# Patient Record
Sex: FEMALE | Race: WHITE | Employment: PART TIME | URBAN - METROPOLITAN AREA
[De-identification: names, ages, dates, MRNs, and addresses within clinical notes are randomized per-mention and may not be internally consistent; named-entity substitution may affect disease eponyms.]

---

## 2021-04-06 ENCOUNTER — OFFICE VISIT (OUTPATIENT)
Dept: ORTHOPEDIC SURGERY | Age: 46
End: 2021-04-06
Payer: COMMERCIAL

## 2021-04-06 VITALS — HEIGHT: 60 IN | BODY MASS INDEX: 21.6 KG/M2 | WEIGHT: 110 LBS | TEMPERATURE: 97.9 F

## 2021-04-06 DIAGNOSIS — M25.561 RIGHT KNEE PAIN, UNSPECIFIED CHRONICITY: Primary | ICD-10-CM

## 2021-04-06 DIAGNOSIS — M24.661 FIBROSIS OF RIGHT KNEE JOINT: ICD-10-CM

## 2021-04-06 PROCEDURE — 99204 OFFICE O/P NEW MOD 45 MIN: CPT | Performed by: ORTHOPAEDIC SURGERY

## 2021-04-06 NOTE — PROGRESS NOTES
12 [facility]  History and Physical  Knee Pain    Date:  2021    Name:  Sujey Ga  Address:  [place] [address]    :  1975      Age:   42 y.o.    SSN:  [id number]      Medical Record Number:  <[id number]>      Chief Complaint    New Patient (right knee )      History of Present Illness:  Sujey Ga is a 42 y.o. female who presents for new patient evaluation from [place] regarding her right knee. The patient has an extensive history to her right knee. The patient is a nurse in [place]. She lives 9 hours away. Back in 2019 in February the patient was skiing and injured her right knee. She sustained an ACL/MCL/lateral meniscus tear. She had a lot of swelling and stiffness to the knee. 5 weeks after that she underwent right knee ACL reconstruction with allograft, MCL healed on its own, and lateral meniscal repair for bucket-handle meniscus tear. The patient developed arthrofibrosis postoperatively and underwent an manipulation under anesthesia in May 2019. In 2019 she continued to have problems with arthrofibrosis recurrence and underwent a knee arthroscopy with notchplasty and synovectomy and manipulation under anesthesia again. The patient underwent multiple sessions of physical therapy but continues to have stiffness to the knee. She is here as a second opinion to see if there is any other options for her knee motion. She denies instability. She denies locking.       Pain Assessment  Location of Pain: Knee  Location Modifiers: Right  Severity of Pain: 3  Quality of Pain: Aching  Duration of Pain: Persistent  Frequency of Pain: Constant  Aggravating Factors: Bending, Squatting, Stairs, Walking, Other (Comment)(any prolonged activity)  Relieving Factors: Rest, Nsaids(motrin)  Result of Injury: Yes  Work-Related Injury: No  Are there other pain locations you wish to document?: No    No past medical history on file. No past surgical history on file. No family history on file. Social History     Socioeconomic History    Marital status:      Spouse name: Not on file    Number of children: Not on file    Years of education: Not on file    Highest education level: Not on file   Occupational History    Not on file   Social Needs    Financial resource strain: Not on file    Food insecurity     Worry: Not on file     Inability: Not on file    Transportation needs     Medical: Not on file     Non-medical: Not on file   Tobacco Use    Smoking status: Not on file   Substance and Sexual Activity    Alcohol use: Not on file    Drug use: Not on file    Sexual activity: Not on file   Lifestyle    Physical activity     Days per week: Not on file     Minutes per session: Not on file    Stress: Not on file   Relationships    Social connections     Talks on phone: Not on file     Gets together: Not on file     Attends Sabianist service: Not on file     Active member of club or organization: Not on file     Attends meetings of clubs or organizations: Not on file     Relationship status: Not on file    Intimate partner violence     Fear of current or ex partner: Not on file     Emotionally abused: Not on file     Physically abused: Not on file     Forced sexual activity: Not on file   Other Topics Concern    Not on file   Social History Narrative    Not on file       No current outpatient medications on file. No current facility-administered medications for this visit. No Known Allergies      Review of Systems:  A 14 point review of systems was completed by the patient and is available in the media section of the scanned medical record and was reviewed on 4/6/2021. The review is negative with the exception of those things mentioned in the HPI and Past Medical History     Review of Systems   Constitutional: Negative for fever and chills. HENT: Negative for congestion and eye pain.     Eyes: Negative for blurred vision and double vision. Respiratory: Negative for cough, shortness of breath and wheezing. Cardiovascular: Negative for chest pain and palpitations. Gastrointestinal: Negative for nausea. Negative for vomiting. Musculoskeletal: Positive for myalgias and joint pain right knee. Skin: Negative for itching and rash. Neurological: Negative for dizziness, sensory change and headaches. Psychiatric/Behavioral: Negative for depression and suicidal ideas. Vital Signs:   Temp 97.9 °F (36.6 °C) (Infrared)   Ht 5' (1.524 m)   Wt 110 lb (49.9 kg)   BMI 21.48 kg/m²     General Exam:    General: Farnaz Smith is a healthy and well appearing 42y.o. year old female who is sitting comfortably in our office in no acute distress. Neuro: Alert & Oriented x 3,  normal,  no focal deficits noted. Normal mood & affect. Knee Examination: Right    Inspection:   No effusion, ecchymosis, discoloration, erythema, excessive warmth. no gross deformities, no signs of infection. Healed surgical scars about the knee. Palpation: There is no patellofemoral crepitus, there is patellofemoral joint line tenderness. No other osseous or soft tissue tenderness around the knee. Negative calf tenderness    Range of Motion: 5-125 degrees with some pain on deep flexion and full extension    Strength:  5/5 quadriceps, 5/5 hamstrings    Special Tests:  No ligament instability, valgus and varus stress test are stable at 0 and 30°. Lachman's exhibits a solid endpoint. Negative posterior drawer. Negative Homans sign. Less than 1 quadrant motion patella medial and lateral.  Tight lateral retinaculum and 30 degrees of flexion.     Gait:  Steady, Non antalgic, without the use of assistive devices    Alignment: No varus deformity    Neuro: Sensation equal bilateral lower extremities    Vascular: 2+ posterior tibialis pulse      Contralateral Knee Comparison Examination: Left    Inspection:  No effusion, ecchymosis, discoloration, erythema, excessive warmth. no gross deformities, no signs of infection. Palpation: There is no patellofemoral crepitus, there is no joint line tenderness. No other osseous or soft tissue tenderness around the knee. Negative calf tenderness    Range of Motion:  0-140 degrees without pain or difficulty    Strength:  5/5 quadriceps, 5/5 hamstrings    Special Tests:   No ligament instability, valgus and varus stress test are stable at 0 and 30°. Lachman's exhibits a solid endpoint. Negative posterior drawer. Negative Homans sign    Gait:  Steady, Non antalgic, without the use of assistive devices    Alignment: No Varus deformity    Neuro: Sensation equal bilateral lower extremities    Vascular: 2+ posterior tibialis pulse      Radiology:     X-rays obtained and reviewed in office: AP and merchant view of both knees and a lateral of the right knee. Findings:   Views: 3 views of the right knee  Weight bearing: Yes  Findings: 3 views of the right knee taken in the office today demonstrate no acute osseous abnormalities. There is evidence of a prior ACL reconstruction with appropriately placed ACL tunnels. Slight lateral patellar tilt. Slight patellofemoral arthritic changes. Well-maintained medial lateral joint space. Previous comparison films: None    Impression:  1. Prior ACL reconstruction right knee with mild patellofemoral arthritis. Office Procedures:  Orders Placed This Encounter   Procedures    XR KNEE RIGHT (3 VIEWS)     Standing Status:   Future     Number of Occurrences:   1     Standing Expiration Date:   4/6/2022            Assessment: Patient is a 42 y.o. 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     Visit Diagnoses       Codes    Right knee pain, unspecified chronicity    -  Primary M25.561          No orders of the defined types were placed in this for evaluation, education, and development of treatment plan: 55 minutes. Claude Sarkar DO  [facility] Clinical Fellow  4/6/2021      This dictation was performed with a verbal recognition program [facility] ) and it was checked for errors. It is possible that there are still dictated errors within this office note. If so, please bring any errors to my attention for an addendum. All efforts were made to ensure that this office note is accurate. This dictation was performed with a verbal recognition program (DRAGON) and it was checked for errors. It is possible that there are still dictated errors within this office note. If so, please bring any errors to my attention for an addendum. All efforts were made to ensure that this office note is accurate. Supervising Physician Attestation:  I, Dr. Tavon Cuevas, personally performed the services described in this documentation as scribed above, and it is both accurate and complete and I agree with all pertinent clinical information. I personally interviewed the patient and performed a physical examination and medical decision making. I discussed the patient's condition and treatment options and have  reviewed and agree with the past medical, family and social history unless otherwise noted. All of the patient's questions were answered.       Board Certified Orthopaedic Surgeon  [facility] and [address] [place] [address] and [address] [employer]  Professor of Cristine Salvador

## 2021-04-07 DIAGNOSIS — M24.661 FIBROSIS OF RIGHT KNEE JOINT: Primary | ICD-10-CM

## 2021-04-12 ENCOUNTER — TELEPHONE (OUTPATIENT)
Dept: ORTHOPEDIC SURGERY | Age: 46
End: 2021-04-12

## 2021-04-12 NOTE — TELEPHONE ENCOUNTER
Auth: NPR  Date: 5/12/2021   Reference # [id number]  Spoke with: Online  Type of SX: Outpatient  Location: [facility]  CPT [id number]   SX area: Rt knee  Insurance: [address] [place] [address]

## 2021-05-06 RX ORDER — IBUPROFEN 200 MG
200 TABLET ORAL EVERY 6 HOURS PRN
Status: ON HOLD | COMMUNITY
End: 2021-05-12 | Stop reason: HOSPADM

## 2021-05-06 RX ORDER — NITROFURANTOIN 25; 75 MG/1; MG/1
100 CAPSULE ORAL PRN
COMMUNITY

## 2021-05-06 RX ORDER — M-VIT,TX,IRON,MINS/CALC/FOLIC 27MG-0.4MG
1 TABLET ORAL DAILY
COMMUNITY

## 2021-05-06 NOTE — PROGRESS NOTES
[address] patients having surgery or anesthesia are required to be Covid tested. You will need to quarantine from the time you are tested until your surgery. PRIOR TO PROCEDURE DATE:        1. PLEASE FOLLOW ANY  GUIDELINES/ INSTRUCTIONS PRIOR TO YOUR PROCEDURE AS ADVISED BY YOUR SURGEON. 2. Arrange for someone to drive you home and be with you for the first 24 hours after discharge for your safety after your procedure for which you received sedation. Ensure it is someone we can share information with regarding your discharge. 3. You must contact your surgeon for instructions IF:   You are taking any blood thinners, aspirin, anti-inflammatory or vitamin E.   There is a change in your physical condition such as a cold, fever, rash, cuts, sores or any other infection, especially near your surgical site. 4. Do not drink alcohol the day before or day of your procedure. 5. A Pre-op History and Physical for surgery MUST be completed by your Physician or Urgent Care within 30 days of your procedure date. Please bring a copy with you on the day of your procedure and along with any other testing performed. THE DAY OF YOUR PROCEDURE:  1. Follow instructions for ARRIVAL TIME as DIRECTED BY YOUR SURGEON. 2. Enter the MAIN entrance from [address] and follow the signs to the free [employer] [facility] or [employer] parking (offered free of charge 6am-5pm). 3. Enter the Main Entrance of the hospital (do not enter from the lower level of the parking garage). Upon entrance, check in with the  at the main desk on your left. If no one is available at the desk, proceed into the [facility] Waiting Room and go through the door directly into the [facility]. There is a Check-in desk ACROSS from Room 5 (marked with a sign hanging from the ceiling).  The phone number for the surgery center is [phone]. 4. Please call [phone] option #2 option #2 if you have not been preregistered yet. On the day of your procedure bring your insurance card and photo ID. You will be registered at your bedside once brought back to your room. 5. DO NOT EAT ANYTHING eight hours prior to your arrival for surgery. May have 8 ounces of water 4 hours prior to your arrival for surgery. NOTE: ALL Gastric, Bariatric and Bowel surgery patients MUST follow their surgeon's instructions. 6. MEDICATIONS    Take the following medications with a SMALL sip of water: NONE   Use your usual dose of inhalers the morning of surgery. BRING your rescue inhaler with you to hospital.    Anesthesia does NOT want you to take insulin the morning of surgery. They will control your blood sugar while you are at the hospital. Please contact your ordering physician for instructions regarding your insulin the night before your procedure. If you have an insulin pump, please keep it set on basal rate. 7. Do not swallow water when brushing teeth. No gum, candy, mints or ice chips. Refrain from smoking or at least decrease the amount. 8. Dress in loose, comfortable clothing appropriate for redressing after your procedure. Do not wear jewelry (including body piercings), make-up (especially NO eye make-up), fingernail polish (NO toenail polish if foot/leg surgery), lotion, powders or metal hairclips. 9. Dentures, glasses, or contacts will need to be removed before your procedure. Bring cases for your glasses, contacts, dentures, or hearing aids to protect them while you are in surgery. 10. If you use a CPAP, please bring it with you on the day of your procedure. 11. We recommend that valuable personal  belongings such as cash, cell phones, e-tablets or jewelry, be left at home during your stay. The hospital will not be responsible for valuables that are not secured in the hospital safe.  However, if your insurance requires a co-pay, you may want to bring a method of payment, i.e. Check or credit card, if you wish to pay your co-pay the day of surgery. 12. If you are to stay overnight, you may bring a bag with personal items. Please have any large items you may need brought in by your family after your arrival to your hospital room. 15. If you have a Living Will or Durable Power of , please bring a copy on the day of your procedure. 15. With your permission, one family member may accompany you while you are being prepared for surgery. Once you are ready, additional family members may join you. HOW WE KEEP YOU SAFE and WORK TO PREVENT SURGICAL SITE INFECTIONS:  1. Health care workers should always check your ID bracelet to verify your name and birth date. You will be asked many times to state your name, date of birth, and allergies. 2. Health care workers should always clean their hands with soap or alcohol gel before providing care to you. It is okay to ask anyone if they cleaned their hands before they touch you. 3. You will be actively involved in verifying the type of procedure you are having and ensuring the correct surgical site. This will be confirmed multiple times prior to your procedure. Do NOT selma your surgery site UNLESS instructed to by your surgeon. 4. Do not shave or wax for 72 hours prior to procedure near your operative site. Shaving with a razor can irritate your skin and make it easier to develop an infection. On the day of your procedure, any hair that needs to be removed near the surgical site will be clipped by a healthcare worker using a special clippers designed to avoid skin irritation. 5. When you are in the operating room, your surgical site will be cleansed with a special soap, and in most cases, you will be given an antibiotic before the surgery begins. What to expect AFTER YOUR PROCEDURE:  1.  Immediately following your procedure, your will be taken to the PACU for

## 2021-05-11 ENCOUNTER — CONSENT FORM (OUTPATIENT)
Dept: ORTHOPEDIC SURGERY | Age: 46
End: 2021-05-11

## 2021-05-11 ENCOUNTER — HOSPITAL ENCOUNTER (OUTPATIENT)
Dept: PHYSICAL THERAPY | Age: 46
Setting detail: THERAPIES SERIES
Discharge: HOME | End: 2021-05-11
Payer: COMMERCIAL

## 2021-05-11 ENCOUNTER — ANESTHESIA EVENT (OUTPATIENT)
Dept: OPERATING ROOM | Age: 46
End: 2021-05-11
Payer: COMMERCIAL

## 2021-05-11 ENCOUNTER — OFFICE VISIT (OUTPATIENT)
Dept: ORTHOPEDIC SURGERY | Age: 46
End: 2021-05-11
Payer: COMMERCIAL

## 2021-05-11 VITALS — BODY MASS INDEX: 21.99 KG/M2 | HEIGHT: 60 IN | WEIGHT: 111.99 LBS

## 2021-05-11 DIAGNOSIS — M24.661 FIBROSIS OF RIGHT KNEE JOINT: Primary | ICD-10-CM

## 2021-05-11 DIAGNOSIS — M25.561 RIGHT KNEE PAIN, UNSPECIFIED CHRONICITY: ICD-10-CM

## 2021-05-11 PROCEDURE — E0114 CRUTCH UNDERARM PAIR NO WOOD: HCPCS | Performed by: ORTHOPAEDIC SURGERY

## 2021-05-11 PROCEDURE — MISC250 COMPRESSION STOCKING: Performed by: ORTHOPAEDIC SURGERY

## 2021-05-11 PROCEDURE — 99214 OFFICE O/P EST MOD 30 MIN: CPT | Performed by: ORTHOPAEDIC SURGERY

## 2021-05-11 NOTE — PROGRESS NOTES
effect during my hospitalization, the order may or may not be in effect during this procedure. I give my doctor permission to give me blood or blood products. I understand that there are risks with receiving blood such as hepatitis, AIDS, fever, or allergic reaction. I acknowledge that the risks, benefits, and alternatives of this treatment have been explained to me and that no express or implied warranty has been given by the hospital, any blood bank, or any person or entity as to the blood or blood components transfused. At the discretion of my doctor, I agree to allow observers, equipment/product representatives and allow photographing, and/or televising of the procedure, provided my name or identity is maintained confidentially. I agree the hospital may dispose of or use for scientific or educational purposes any tissue, fluid, or body parts which may be removed.     ________________________________Date________Time______ am/pm  ([facility])  Patient or Signature of Closest Relative or Legal Guardian    ________________________________Date________Time______am/pm      Page 1 of  1  Witness

## 2021-05-11 NOTE — PROGRESS NOTES
[address] [place] [address]  History and Physical      Date:  2021    Name:  Jesus Ham  Address:  [address] [place] [address]    :  1975      Age:   42 y.o.    SSN:  [id number]      Medical Record Number:  <[id number]>      Chief Complaint    Pre-op Exam (right knee med and lat releases on 21)      History of Present Illness:  Jesus Ham is a 42 y.o. female who presents for their pre-op examination. The patient is in good health. The patient has no history of blood clots, no organ dysfunction, not diabetes, tolerates pain medications and is not on any estrogen products. Patient has an allergy to latex which causes her extensive amount of coughing. She also gets blisters with adhesive tape. The patient recently had a physical from her PCP and was cleared for surgery and stated being in good health. Patient's PCP note for preoperative clearance was reviewed. All the patient's labs were reviewed and were remarkable for gross hematuria. Patient has had a full work-up for this condition which was unremarkable. Prior history: 2021  This is a patient from [place] regarding her right knee. The patient has an extensive history to her right knee. The patient is a nurse in [place]. She lives 9 hours away. Back in  in February the patient was skiing and injured her right knee. She sustained an ACL/MCL/lateral meniscus tear. She had a lot of swelling and stiffness to the knee. 5 weeks after that she underwent right knee ACL reconstruction with allograft, MCL healed on its own, and lateral meniscal repair for bucket-handle meniscus tear. The patient developed arthrofibrosis postoperatively and underwent an manipulation under anesthesia in May 2019.   In 2019 she continued to have problems with arthrofibrosis recurrence and underwent a knee arthroscopy with notchplasty and synovectomy and manipulation under anesthesia again.  The patient underwent multiple sessions of physical therapy but continues to have stiffness to the knee.         Pain Assessment  Location of Pain: Knee  Location Modifiers: Right  Severity of Pain: 2  Quality of Pain: Aching  Duration of Pain: Persistent  Frequency of Pain: Constant  Aggravating Factors: Walking, Stairs, Other (Comment)(flexion)  Relieving Factors: Rest  Result of Injury: Yes  Work-Related Injury: No  Are there other pain locations you wish to document?: No    Past Medical History:   Diagnosis Date    Arthritis     History of UTI     Osteoarthritis     PONV (postoperative nausea and vomiting)         Past Surgical History:   Procedure Laterality Date    BREAST ENHANCEMENT SURGERY      KNEE SURGERY      OVARIAN CYST REMOVAL         Family History   Problem Relation Age of Onset    Cancer Mother     Heart Disease Mother     High Blood Pressure Father        Social History     Socioeconomic History    Marital status:      Spouse name: None    Number of children: None    Years of education: None    Highest education level: None   Occupational History    None   Social Needs    Financial resource strain: None    Food insecurity     Worry: None     Inability: None    Transportation needs     Medical: None     Non-medical: None   Tobacco Use    Smoking status: Never Smoker    Smokeless tobacco: Never Used   Substance and Sexual Activity    Alcohol use: Yes     Comment: RARE    Drug use: Never    Sexual activity: None   Lifestyle    Physical activity     Days per week: None     Minutes per session: None    Stress: None   Relationships    Social connections     Talks on phone: None     Gets together: None     Attends Scientology service: None     Active member of club or organization: None     Attends meetings of clubs or organizations: None     Relationship status: None    Intimate partner violence     Fear of current or ex partner: None     Emotionally abused: None Physically abused: None     Forced sexual activity: None   Other Topics Concern    None   Social History Narrative    None       Current Outpatient Medications   Medication Sig Dispense Refill    Multiple Vitamins-Minerals (THERAPEUTIC MULTIVITAMIN-MINERALS) tablet Take 1 tablet by mouth daily      nitrofurantoin, macrocrystal-monohydrate, (MACROBID) 100 MG capsule Take 100 mg by mouth as needed      ibuprofen (ADVIL;MOTRIN) 200 MG tablet Take 200 mg by mouth every 6 hours as needed for Pain       No current facility-administered medications for this visit. Allergies   Allergen Reactions    Latex      COUGHING    Adhesive Tape      BLISTER       Review of Systems:  A 14 point review of systems was completed by the patient and is available in the media section of the scanned medical record and was reviewed on 5/11/2021. The review is negative with the exception of those things mentioned in the HPI and Past Medical History       Vital Signs:   Ht 5' (1.524 m)   Wt 111 lb 15.9 oz (50.8 kg)   BMI 21.87 kg/m²     General Exam:    Neuro: Alert & Oriented x 3,  normal,  no focal deficits noted. Normal mood & affect. Eyes: Sclera clear  Ears: Normal external ear  Mouth:  No perioral lesions  Pulm: Respirations unlabored and regular   Skin: Warm, well perfused      Knee Examination: Right     Inspection: Well-healed scars from prior surgery. No effusion, ecchymosis, discoloration, erythema, excessive warmth. no gross deformities, no signs of infection. Healed surgical scars about the knee.     Palpation: There is no patellofemoral crepitus, there is patellofemoral joint line tenderness. No other osseous or soft tissue tenderness around the knee. Negative calf tenderness     Range of Motion: 5-110 degrees with some pain on deep flexion and full extension     Strength: Grossly intact     Special Tests:  No ligament instability, Negative Homans sign.   Less than 1 quadrant motion patella medial and lateral.  Tight lateral retinaculum and 30 degrees of flexion.     Gait:  Steady, Non antalgic, without the use of assistive devices     Alignment: No varus deformity     Neuro: Sensation equal bilateral lower extremities     Vascular: 2+ posterior tibialis pulse             Office Procedures:  Orders Placed This Encounter   Procedures    Compression Stocking $30     Patient was supplied a Compression Sleeve. This retail item was supplied to provide functional support and assist in controlling swelling in the lower extremities. Verbal and written instructions for the use of and application of this item were provided. The patient was educated and fit by a healthcare professional with expert knowledge and specialization in brace application. They were instructed to contact the office immediately should the equipment result in increased pain, decreased sensation, increased swelling or worsening of the condition.  Aluminum Crutches     Patient was prescribed Medline Aluminum Crutches. This mobility device is required for the following reasons:    Patient has mobility limitations that significantly impair their ability to participate in one or more mobility related activities of daily living. The patient is able to safely use the mobility device. Functional mobility deficit can be sufficiently resolved with the use of this device. Verbal and written instructions for the use of and application of this item were provided. The patient was educated and fit by a healthcare professional with expert knowledge and specialization in brace application while under the direct supervision of the treating physician. They were instructed to contact the office immediately should the equipment result in increased pain, decreased sensation, increased swelling or worsening of the condition. Assessment: Patient is a 42 y.o. female presenting to the office for her preoperative visit.   Patient is scheduled to undergo a right medial lateral release and Z-plasty lengthening. Visit Diagnoses       Codes    Fibrosis of right knee joint    -  Primary M24.661    Right knee pain, unspecified chronicity     M25.561            Medical decision making/treatment plan:  Patient's workup and evaluation were reviewed with the patient in the office today. Perioperative and postoperative course was thoroughly reviewed with the patient. She will be staying in town of a hotel for 2 weeks postoperatively to attend therapy. The patient is scheduled for right medial lateral release and Z-plasty lengthening and a discussion and comprehensive presentation of right medial lateral release and Z-plasty lengthening including providing extensive information and written material on the surgery, PO course and rehab and patient expectations and compliance. The PO instruction sheet is provided and details on the DVT program and skin preparation pre op explained. The risks explained included but not limited to infection, pain, swelling, woumd healing, blood clots, bleeding, fibrosis, anesthesia, allergies meds, atrophy, and limitation in knee motion, need for additional surgery, alignment problems. The final result cannot be predicted and each patient responds to surgery differently. The patient verbally expressed an understanding and all questions answered. The patient has major arthritic damage to the knee joint with pain limiting daily activities and significant restrictions and limitations effecting the quality of life. All conservative measures have been completed and the patient wishes to undergo right medial lateral release and Z-plasty lengthening. All the patient's questions were answered while in the clinic. The patient is understanding of all instructions and agrees with the plan.       This patient exhibits moderate complexity for obtaining an independent history, reviewing past medical records, independent interpretation of diagnostic imaging, and further coordinating care. The patient exhibits high risk given that the patient is undergoing a major elective orthopedic surgery with identified risk factors. Approximately 30 minutes were spent reviewing past medical records, imaging, educating the patient, and coordinating care. 05/11/21  1:17 PM                [address]fa[address] Dai, WILLIAM    Physician Assistant - Certified  [facility] and [address] [place] [address]    05/11/21 1:17 PM    During this examination, I, [address] Tesfaye Lala, [address] [place] [address], functioned as a scribe for Dr. Winsome Bundy. This dictation was performed with a verbal recognition program (DRAGON) and it was checked for errors. It is possible that there are still dictated errors within this office note. If so, please bring any errors to my attention for an addendum. All efforts were made to ensure that this office note is accurate. This dictation was performed with a verbal recognition program (DRAGON) and it was checked for errors. It is possible that there are still dictated errors within this office note. If so, please bring any errors to my attention for an addendum. All efforts were made to ensure that this office note is accurate. Supervising Physician Attestation:  I, Dr. Winsome Bundy, personally performed the services described in this documentation as scribed above, and it is both accurate and complete and I agree with all pertinent clinical information. I personally interviewed the patient and performed a physical examination and medical decision making. I discussed the patient's condition and treatment options and have  reviewed and agree with the past medical, family and social history unless otherwise noted. All of the patient's questions were answered.       Board Certified Orthopaedic Surgeon  [facility] and [facility] and [address] and [facility]  Professor of Cristine Perez

## 2021-05-11 NOTE — FLOWSHEET NOTE
[facility]    Physical Therapy  Cancellation/No-show Note  Patient Name:  Inge Strange  :  1975   Date:  2021  Cancelled visits to date: 1  No-shows to date: 0    For today's appointment patient:  [x]  Cancelled  []  Rescheduled appointment  []  No-show     Reason given by patient:  []  Patient ill  []  Conflicting appointment   []  No transportation    []  Conflict with work  []  No reason given  [x]  Other: Patient was upstairs with MD for 90  Minutes and missed pre-op appointment; all pre-op paperwork was given to patient as well as PT contact information; she will follow up Thursday at PO appointment.      Comments:      Electronically signed by:  Gayatri Gonzales, PT, DPT, OCS

## 2021-05-12 ENCOUNTER — ANESTHESIA (OUTPATIENT)
Dept: OPERATING ROOM | Age: 46
End: 2021-05-12
Payer: COMMERCIAL

## 2021-05-12 ENCOUNTER — HOSPITAL ENCOUNTER (OUTPATIENT)
Age: 46
Setting detail: OUTPATIENT SURGERY
Discharge: HOME | End: 2021-05-12
Attending: ORTHOPAEDIC SURGERY | Admitting: ORTHOPAEDIC SURGERY
Payer: COMMERCIAL

## 2021-05-12 VITALS
BODY MASS INDEX: 21.99 KG/M2 | HEART RATE: 93 BPM | TEMPERATURE: 96.9 F | OXYGEN SATURATION: 97 % | WEIGHT: 112 LBS | RESPIRATION RATE: 15 BRPM | HEIGHT: 60 IN | DIASTOLIC BLOOD PRESSURE: 82 MMHG | SYSTOLIC BLOOD PRESSURE: 132 MMHG

## 2021-05-12 VITALS
DIASTOLIC BLOOD PRESSURE: 66 MMHG | RESPIRATION RATE: 10 BRPM | SYSTOLIC BLOOD PRESSURE: 101 MMHG | OXYGEN SATURATION: 99 % | TEMPERATURE: 97.3 F

## 2021-05-12 DIAGNOSIS — M24.661 ARTHROFIBROSIS OF KNEE JOINT, RIGHT: ICD-10-CM

## 2021-05-12 LAB — PREGNANCY, URINE: NEGATIVE

## 2021-05-12 PROCEDURE — 2580000003 HC RX 258: Performed by: ANESTHESIOLOGY

## 2021-05-12 PROCEDURE — C9290 INJ, BUPIVACAINE LIPOSOME: HCPCS | Performed by: ANESTHESIOLOGY

## 2021-05-12 PROCEDURE — 3600000014 HC SURGERY LEVEL 4 ADDTL 15MIN: Performed by: ORTHOPAEDIC SURGERY

## 2021-05-12 PROCEDURE — 7100000001 HC PACU RECOVERY - ADDTL 15 MIN: Performed by: ORTHOPAEDIC SURGERY

## 2021-05-12 PROCEDURE — 3700000000 HC ANESTHESIA ATTENDED CARE: Performed by: ORTHOPAEDIC SURGERY

## 2021-05-12 PROCEDURE — 87075 CULTR BACTERIA EXCEPT BLOOD: CPT

## 2021-05-12 PROCEDURE — 6360000002 HC RX W HCPCS: Performed by: ORTHOPAEDIC SURGERY

## 2021-05-12 PROCEDURE — 2500000003 HC RX 250 WO HCPCS: Performed by: ANESTHESIOLOGY

## 2021-05-12 PROCEDURE — 2709999900 HC NON-CHARGEABLE SUPPLY: Performed by: ORTHOPAEDIC SURGERY

## 2021-05-12 PROCEDURE — 87176 TISSUE HOMOGENIZATION CULTR: CPT

## 2021-05-12 PROCEDURE — 7100000011 HC PHASE II RECOVERY - ADDTL 15 MIN: Performed by: ORTHOPAEDIC SURGERY

## 2021-05-12 PROCEDURE — 88331 PATH CONSLTJ SURG 1 BLK 1SPC: CPT

## 2021-05-12 PROCEDURE — 64447 NJX AA&/STRD FEMORAL NRV IMG: CPT | Performed by: ANESTHESIOLOGY

## 2021-05-12 PROCEDURE — 7100000000 HC PACU RECOVERY - FIRST 15 MIN: Performed by: ORTHOPAEDIC SURGERY

## 2021-05-12 PROCEDURE — 6370000000 HC RX 637 (ALT 250 FOR IP): Performed by: ANESTHESIOLOGY

## 2021-05-12 PROCEDURE — 3600000004 HC SURGERY LEVEL 4 BASE: Performed by: ORTHOPAEDIC SURGERY

## 2021-05-12 PROCEDURE — 87205 SMEAR GRAM STAIN: CPT

## 2021-05-12 PROCEDURE — 3700000001 HC ADD 15 MINUTES (ANESTHESIA): Performed by: ORTHOPAEDIC SURGERY

## 2021-05-12 PROCEDURE — 6360000002 HC RX W HCPCS: Performed by: ANESTHESIOLOGY

## 2021-05-12 PROCEDURE — 2500000003 HC RX 250 WO HCPCS: Performed by: NURSE ANESTHETIST, CERTIFIED REGISTERED

## 2021-05-12 PROCEDURE — 87070 CULTURE OTHR SPECIMN AEROBIC: CPT

## 2021-05-12 PROCEDURE — 7100000010 HC PHASE II RECOVERY - FIRST 15 MIN: Performed by: ORTHOPAEDIC SURGERY

## 2021-05-12 PROCEDURE — 84703 CHORIONIC GONADOTROPIN ASSAY: CPT

## 2021-05-12 PROCEDURE — 2580000003 HC RX 258: Performed by: ORTHOPAEDIC SURGERY

## 2021-05-12 PROCEDURE — 88305 TISSUE EXAM BY PATHOLOGIST: CPT

## 2021-05-12 PROCEDURE — 6360000002 HC RX W HCPCS: Performed by: NURSE ANESTHETIST, CERTIFIED REGISTERED

## 2021-05-12 RX ORDER — FENTANYL CITRATE 50 UG/ML
25 INJECTION, SOLUTION INTRAMUSCULAR; INTRAVENOUS EVERY 5 MIN PRN
Status: DISCONTINUED | OUTPATIENT
Start: 2021-05-12 | End: 2021-05-12 | Stop reason: HOSPADM

## 2021-05-12 RX ORDER — SCOLOPAMINE TRANSDERMAL SYSTEM 1 MG/1
1 PATCH, EXTENDED RELEASE TRANSDERMAL
Status: DISCONTINUED | OUTPATIENT
Start: 2021-05-12 | End: 2021-05-12 | Stop reason: HOSPADM

## 2021-05-12 RX ORDER — VALACYCLOVIR HYDROCHLORIDE 1 G/1
1 TABLET, FILM COATED ORAL PRN
COMMUNITY

## 2021-05-12 RX ORDER — FENTANYL CITRATE 50 UG/ML
100 INJECTION, SOLUTION INTRAMUSCULAR; INTRAVENOUS ONCE
Status: DISCONTINUED | OUTPATIENT
Start: 2021-05-12 | End: 2021-05-12 | Stop reason: HOSPADM

## 2021-05-12 RX ORDER — OXYCODONE HYDROCHLORIDE AND ACETAMINOPHEN 5; 325 MG/1; MG/1
2 TABLET ORAL PRN
Status: COMPLETED | OUTPATIENT
Start: 2021-05-12 | End: 2021-05-12

## 2021-05-12 RX ORDER — CEFAZOLIN SODIUM 2 G/50ML
2000 SOLUTION INTRAVENOUS ONCE
Status: COMPLETED | OUTPATIENT
Start: 2021-05-12 | End: 2021-05-12

## 2021-05-12 RX ORDER — ROCURONIUM BROMIDE 10 MG/ML
INJECTION, SOLUTION INTRAVENOUS PRN
Status: DISCONTINUED | OUTPATIENT
Start: 2021-05-12 | End: 2021-05-12 | Stop reason: SDUPTHER

## 2021-05-12 RX ORDER — MEPERIDINE HYDROCHLORIDE 25 MG/ML
12.5 INJECTION INTRAMUSCULAR; INTRAVENOUS; SUBCUTANEOUS EVERY 5 MIN PRN
Status: DISCONTINUED | OUTPATIENT
Start: 2021-05-12 | End: 2021-05-12 | Stop reason: HOSPADM

## 2021-05-12 RX ORDER — DIPHENHYDRAMINE HYDROCHLORIDE 50 MG/ML
12.5 INJECTION INTRAMUSCULAR; INTRAVENOUS
Status: DISCONTINUED | OUTPATIENT
Start: 2021-05-12 | End: 2021-05-12 | Stop reason: HOSPADM

## 2021-05-12 RX ORDER — NEOSTIGMINE METHYLSULFATE 5 MG/5 ML
SYRINGE (ML) INTRAVENOUS PRN
Status: DISCONTINUED | OUTPATIENT
Start: 2021-05-12 | End: 2021-05-12 | Stop reason: SDUPTHER

## 2021-05-12 RX ORDER — BUPIVACAINE HYDROCHLORIDE 5 MG/ML
INJECTION, SOLUTION EPIDURAL; INTRACAUDAL
Status: COMPLETED | OUTPATIENT
Start: 2021-05-12 | End: 2021-05-12

## 2021-05-12 RX ORDER — MIDAZOLAM HYDROCHLORIDE 1 MG/ML
2 INJECTION INTRAMUSCULAR; INTRAVENOUS ONCE
Status: COMPLETED | OUTPATIENT
Start: 2021-05-12 | End: 2021-05-12

## 2021-05-12 RX ORDER — PROPOFOL 10 MG/ML
INJECTION, EMULSION INTRAVENOUS PRN
Status: DISCONTINUED | OUTPATIENT
Start: 2021-05-12 | End: 2021-05-12 | Stop reason: SDUPTHER

## 2021-05-12 RX ORDER — OXYCODONE HYDROCHLORIDE AND ACETAMINOPHEN 5; 325 MG/1; MG/1
1 TABLET ORAL EVERY 6 HOURS PRN
Qty: 28 TABLET | Refills: 0 | Status: SHIPPED | OUTPATIENT
Start: 2021-05-12 | End: 2021-05-19

## 2021-05-12 RX ORDER — LIDOCAINE HCL/PF 100 MG/5ML
SYRINGE (ML) INJECTION PRN
Status: DISCONTINUED | OUTPATIENT
Start: 2021-05-12 | End: 2021-05-12 | Stop reason: SDUPTHER

## 2021-05-12 RX ORDER — DEXAMETHASONE SODIUM PHOSPHATE 4 MG/ML
INJECTION, SOLUTION INTRA-ARTICULAR; INTRALESIONAL; INTRAMUSCULAR; INTRAVENOUS; SOFT TISSUE PRN
Status: DISCONTINUED | OUTPATIENT
Start: 2021-05-12 | End: 2021-05-12

## 2021-05-12 RX ORDER — OXYCODONE HYDROCHLORIDE AND ACETAMINOPHEN 5; 325 MG/1; MG/1
1 TABLET ORAL PRN
Status: COMPLETED | OUTPATIENT
Start: 2021-05-12 | End: 2021-05-12

## 2021-05-12 RX ORDER — ONDANSETRON 2 MG/ML
INJECTION INTRAMUSCULAR; INTRAVENOUS PRN
Status: DISCONTINUED | OUTPATIENT
Start: 2021-05-12 | End: 2021-05-12 | Stop reason: SDUPTHER

## 2021-05-12 RX ORDER — AMOXICILLIN 250 MG
2 CAPSULE ORAL NIGHTLY
Qty: 28 TABLET | Refills: 0 | Status: SHIPPED | OUTPATIENT
Start: 2021-05-12 | End: 2021-05-26

## 2021-05-12 RX ORDER — GLYCOPYRROLATE 1 MG/5 ML
SYRINGE (ML) INTRAVENOUS PRN
Status: DISCONTINUED | OUTPATIENT
Start: 2021-05-12 | End: 2021-05-12 | Stop reason: SDUPTHER

## 2021-05-12 RX ORDER — ONDANSETRON 4 MG/1
4 TABLET, FILM COATED ORAL 3 TIMES DAILY PRN
Qty: 15 TABLET | Refills: 0 | Status: ON HOLD | OUTPATIENT
Start: 2021-05-12 | End: 2021-06-28 | Stop reason: SDUPTHER

## 2021-05-12 RX ORDER — PROCHLORPERAZINE EDISYLATE 5 MG/ML
5 INJECTION INTRAMUSCULAR; INTRAVENOUS
Status: DISCONTINUED | OUTPATIENT
Start: 2021-05-12 | End: 2021-05-12 | Stop reason: HOSPADM

## 2021-05-12 RX ORDER — APREPITANT 40 MG/1
40 CAPSULE ORAL ONCE
Status: COMPLETED | OUTPATIENT
Start: 2021-05-12 | End: 2021-05-12

## 2021-05-12 RX ORDER — VANCOMYCIN HYDROCHLORIDE 1 G/20ML
INJECTION, POWDER, LYOPHILIZED, FOR SOLUTION INTRAVENOUS PRN
Status: DISCONTINUED | OUTPATIENT
Start: 2021-05-12 | End: 2021-05-12 | Stop reason: ALTCHOICE

## 2021-05-12 RX ORDER — ONDANSETRON 2 MG/ML
4 INJECTION INTRAMUSCULAR; INTRAVENOUS
Status: DISCONTINUED | OUTPATIENT
Start: 2021-05-12 | End: 2021-05-12 | Stop reason: HOSPADM

## 2021-05-12 RX ORDER — FENTANYL CITRATE 50 UG/ML
50 INJECTION, SOLUTION INTRAMUSCULAR; INTRAVENOUS EVERY 5 MIN PRN
Status: DISCONTINUED | OUTPATIENT
Start: 2021-05-12 | End: 2021-05-12 | Stop reason: HOSPADM

## 2021-05-12 RX ORDER — BUPIVACAINE HYDROCHLORIDE 5 MG/ML
30 INJECTION, SOLUTION EPIDURAL; INTRACAUDAL ONCE
Status: DISCONTINUED | OUTPATIENT
Start: 2021-05-12 | End: 2021-05-12 | Stop reason: HOSPADM

## 2021-05-12 RX ORDER — LABETALOL HYDROCHLORIDE 5 MG/ML
5 INJECTION, SOLUTION INTRAVENOUS EVERY 10 MIN PRN
Status: DISCONTINUED | OUTPATIENT
Start: 2021-05-12 | End: 2021-05-12 | Stop reason: HOSPADM

## 2021-05-12 RX ORDER — HYDRALAZINE HYDROCHLORIDE 20 MG/ML
5 INJECTION INTRAMUSCULAR; INTRAVENOUS EVERY 10 MIN PRN
Status: DISCONTINUED | OUTPATIENT
Start: 2021-05-12 | End: 2021-05-12 | Stop reason: HOSPADM

## 2021-05-12 RX ORDER — DEXAMETHASONE SODIUM PHOSPHATE 4 MG/ML
INJECTION, SOLUTION INTRA-ARTICULAR; INTRALESIONAL; INTRAMUSCULAR; INTRAVENOUS; SOFT TISSUE PRN
Status: DISCONTINUED | OUTPATIENT
Start: 2021-05-12 | End: 2021-05-12 | Stop reason: SDUPTHER

## 2021-05-12 RX ORDER — SODIUM CHLORIDE, SODIUM LACTATE, POTASSIUM CHLORIDE, CALCIUM CHLORIDE 600; 310; 30; 20 MG/100ML; MG/100ML; MG/100ML; MG/100ML
INJECTION, SOLUTION INTRAVENOUS CONTINUOUS
Status: DISCONTINUED | OUTPATIENT
Start: 2021-05-12 | End: 2021-05-12 | Stop reason: HOSPADM

## 2021-05-12 RX ADMIN — Medication 0.6 MG: at 11:02

## 2021-05-12 RX ADMIN — FENTANYL CITRATE 25 MCG: 50 INJECTION, SOLUTION INTRAMUSCULAR; INTRAVENOUS at 12:04

## 2021-05-12 RX ADMIN — BUPIVACAINE HYDROCHLORIDE 10 ML: 5 INJECTION, SOLUTION EPIDURAL; INTRACAUDAL; PERINEURAL at 09:07

## 2021-05-12 RX ADMIN — OXYCODONE HYDROCHLORIDE AND ACETAMINOPHEN 1 TABLET: 5; 325 TABLET ORAL at 13:18

## 2021-05-12 RX ADMIN — FENTANYL CITRATE 25 MCG: 50 INJECTION, SOLUTION INTRAMUSCULAR; INTRAVENOUS at 12:36

## 2021-05-12 RX ADMIN — ONDANSETRON 4 MG: 2 INJECTION INTRAMUSCULAR; INTRAVENOUS at 10:20

## 2021-05-12 RX ADMIN — CEFAZOLIN SODIUM 2000 MG: 2 SOLUTION INTRAVENOUS at 09:49

## 2021-05-12 RX ADMIN — SODIUM CHLORIDE, POTASSIUM CHLORIDE, SODIUM LACTATE AND CALCIUM CHLORIDE: 600; 310; 30; 20 INJECTION, SOLUTION INTRAVENOUS at 08:18

## 2021-05-12 RX ADMIN — SODIUM CHLORIDE, POTASSIUM CHLORIDE, SODIUM LACTATE AND CALCIUM CHLORIDE: 600; 310; 30; 20 INJECTION, SOLUTION INTRAVENOUS at 10:33

## 2021-05-12 RX ADMIN — FENTANYL CITRATE 50 MCG: 50 INJECTION, SOLUTION INTRAMUSCULAR; INTRAVENOUS at 11:38

## 2021-05-12 RX ADMIN — DEXAMETHASONE SODIUM PHOSPHATE 8 MG: 4 INJECTION, SOLUTION INTRAMUSCULAR; INTRAVENOUS at 10:20

## 2021-05-12 RX ADMIN — PROPOFOL 150 MG: 10 INJECTION, EMULSION INTRAVENOUS at 09:59

## 2021-05-12 RX ADMIN — BUPIVACAINE 20 ML: 13.3 INJECTION, SUSPENSION, LIPOSOMAL INFILTRATION at 09:07

## 2021-05-12 RX ADMIN — MIDAZOLAM HYDROCHLORIDE 2 MG: 2 INJECTION, SOLUTION INTRAMUSCULAR; INTRAVENOUS at 08:56

## 2021-05-12 RX ADMIN — FENTANYL CITRATE 100 MCG: 50 INJECTION, SOLUTION INTRAMUSCULAR; INTRAVENOUS at 08:57

## 2021-05-12 RX ADMIN — APREPITANT 40 MG: 40 CAPSULE ORAL at 08:54

## 2021-05-12 RX ADMIN — Medication 3 MG: at 11:02

## 2021-05-12 RX ADMIN — Medication 0.2 MG: at 09:58

## 2021-05-12 RX ADMIN — Medication 50 MG: at 09:58

## 2021-05-12 RX ADMIN — ROCURONIUM BROMIDE 40 MG: 10 INJECTION INTRAVENOUS at 10:00

## 2021-05-12 ASSESSMENT — PAIN DESCRIPTION - ONSET: ONSET: ON-GOING

## 2021-05-12 ASSESSMENT — PAIN DESCRIPTION - DESCRIPTORS
DESCRIPTORS: SHARP
DESCRIPTORS: ACHING;CONSTANT
DESCRIPTORS: BURNING;SHARP

## 2021-05-12 ASSESSMENT — PULMONARY FUNCTION TESTS
PIF_VALUE: 17
PIF_VALUE: 1
PIF_VALUE: 15
PIF_VALUE: 15
PIF_VALUE: 17
PIF_VALUE: 10
PIF_VALUE: 17
PIF_VALUE: 2
PIF_VALUE: 2
PIF_VALUE: 16
PIF_VALUE: 16
PIF_VALUE: 17
PIF_VALUE: 10
PIF_VALUE: 17
PIF_VALUE: 17
PIF_VALUE: 1
PIF_VALUE: 17
PIF_VALUE: 17
PIF_VALUE: 10
PIF_VALUE: 32
PIF_VALUE: 1
PIF_VALUE: 16
PIF_VALUE: 17
PIF_VALUE: 16
PIF_VALUE: 18
PIF_VALUE: 17
PIF_VALUE: 1
PIF_VALUE: 10
PIF_VALUE: 17
PIF_VALUE: 2
PIF_VALUE: 2
PIF_VALUE: 17
PIF_VALUE: 2
PIF_VALUE: 17
PIF_VALUE: 16
PIF_VALUE: 10
PIF_VALUE: 16
PIF_VALUE: 17
PIF_VALUE: 6
PIF_VALUE: 10
PIF_VALUE: 17

## 2021-05-12 ASSESSMENT — PAIN DESCRIPTION - ORIENTATION
ORIENTATION: RIGHT;ANTERIOR
ORIENTATION: RIGHT;ANTERIOR
ORIENTATION: ANTERIOR
ORIENTATION: RIGHT

## 2021-05-12 ASSESSMENT — PAIN SCALES - GENERAL
PAINLEVEL_OUTOF10: 6
PAINLEVEL_OUTOF10: 4
PAINLEVEL_OUTOF10: 4
PAINLEVEL_OUTOF10: 0

## 2021-05-12 ASSESSMENT — PAIN DESCRIPTION - FREQUENCY
FREQUENCY: CONTINUOUS
FREQUENCY: CONTINUOUS

## 2021-05-12 ASSESSMENT — PAIN DESCRIPTION - LOCATION
LOCATION: KNEE

## 2021-05-12 ASSESSMENT — PAIN DESCRIPTION - PAIN TYPE: TYPE: SURGICAL PAIN

## 2021-05-12 ASSESSMENT — LIFESTYLE VARIABLES: SMOKING_STATUS: 0

## 2021-05-12 NOTE — PROGRESS NOTES
Anesthesia Dr. Neida Herrera here to do Right Adductor Canal block. O2 placed 2L N/C  Start time:0900  Stop time:0905  Tolerated Well    See flow sheet for vital signs.

## 2021-05-12 NOTE — BRIEF OP NOTE
Brief Postoperative Note      Patient: Juwan Villarreal  YOB: 1975  MRN: [id number]    Date of Procedure: 5/12/2021    Pre-Op Diagnosis: Arthrofibrosis    Post-Op Diagnosis: Same       Procedure(s):  RIGHT KNEE OPEN MEDIAL AND LATERAL RELEASES-Z PLASTY LENGTHENING     Right Knee I&D    Surgeon(s):  MD Aileen Lee DO    Assistant:  Surgical Assistant: Rico Cardoso    Anesthesia: General    Estimated Blood Loss (mL): 60XQ    Complications: None    Specimens:   ID Type Source Tests Collected by Time Destination   1 : TIBIAL TUNNEL SWAB RIGHT KNEE Specimen Joint, Knee CULTURE, SURGICAL Nicci Hand MD 5/12/2021 1045    2 : TIBIAL TUNNEL TISSUE RIGHT KNEE Tissue Tissue CULTURE, TISSUE Nicci Hand MD 5/12/2021 1046    A : TIBIAL TUNNEL TISSUE RIGHT KNEE Tissue Tissue SURGICAL PATHOLOGY Nicci Hand MD 5/12/2021 1038    B : TIBIAL GRAFT RIGHT KNEE Tissue Tissue SURGICAL PATHOLOGY Nicci Hand MD 5/12/2021 1048        Implants:  * No implants in log *      Drains: * No LDAs found *    Findings: see op note    Electronically signed by Aileen Mei DO on 5/12/2021 at 11:11 AM

## 2021-05-12 NOTE — PROGRESS NOTES
4610 Clarified with Dr. Toma Goldmann concerning consent and added 'removal of hardware' with pt verbalization of understanding and Dr. Roxanne Swain stating 'pt will be receiving pre-op nerve block' with pt verbalization of understanding.

## 2021-05-12 NOTE — H&P
[facility] Day [facility] Update H & P  Department of General Surgery   Surgical Service   Pre-operative History and Physical  Last H & P within the last 30 days. DIAGNOSIS:   Arthrofibrosis of knee joint, right [M24.661]    Procedure(s):  RIGHT KNEE OPEN MEDIAL AND LATERAL RELEASES-Z PLASTY LENGTHENING     HISTORY OF PRESENT ILLNESS:   Patient with right knee pain and stiffness S/P ACL and lateral meniscus tear by an out of town surgeon. The symptoms have been recalcitrant to conservative treatment and the patient presents today for the above procedure. Covid 19:  Patient denies fever, chills, cough or known exposure to Covid-19.       Past Medical History:        Diagnosis Date    Arthritis     History of UTI     Osteoarthritis     PONV (postoperative nausea and vomiting)      Past Surgical History:        Procedure Laterality Date    BREAST ENHANCEMENT SURGERY      KNEE SURGERY      OVARIAN CYST REMOVAL       Past Social History:  Social History     Socioeconomic History    Marital status:      Spouse name: None    Number of children: None    Years of education: None    Highest education level: None   Occupational History    None   Social Needs    Financial resource strain: None    Food insecurity     Worry: None     Inability: None    Transportation needs     Medical: None     Non-medical: None   Tobacco Use    Smoking status: Never Smoker    Smokeless tobacco: Never Used   Substance and Sexual Activity    Alcohol use: Yes     Comment: RARE    Drug use: Never    Sexual activity: None   Lifestyle    Physical activity     Days per week: None     Minutes per session: None    Stress: None   Relationships    Social connections     Talks on phone: None     Gets together: None     Attends Mandaen service: None     Active member of club or organization: None     Attends meetings of clubs or organizations: None     Relationship status: None    Intimate partner violence     Fear of current or ex partner: None     Emotionally abused: None     Physically abused: None     Forced sexual activity: None   Other Topics Concern    None   Social History Narrative    None         Medications Prior to Admission:      Prior to Admission medications    Medication Sig Start Date End Date Taking? Authorizing Provider   vitamin D (CHOLECALCIFEROL) 25 MCG (1000 UT) TABS tablet Take 1 tablet by mouth daily   Yes Historical Provider, MD   Multiple Vitamins-Minerals (THERAPEUTIC MULTIVITAMIN-MINERALS) tablet Take 1 tablet by mouth daily   Yes Historical Provider, MD   nitrofurantoin, macrocrystal-monohydrate, (MACROBID) 100 MG capsule Take 100 mg by mouth as needed   Yes Historical Provider, MD   ibuprofen (ADVIL;MOTRIN) 200 MG tablet Take 200 mg by mouth every 6 hours as needed for Pain   Yes Historical Provider, MD   valACYclovir (VALTREX) 1 g tablet Take 1 tablet by mouth as needed    Historical Provider, MD         Allergies:  Latex and Adhesive tape    PHYSICAL EXAM:      /77   Pulse 77   Temp 98.1 °F (36.7 °C) (Oral)   Resp 16   Ht 5' (1.524 m)   Wt 112 lb (50.8 kg)   SpO2 96%   BMI 21.87 kg/m²      Airway:  Airway patent with no audible stridor    Heart:  Regular rate and rhythm, No murmur noted    Lungs:  No increased work of breathing, good air exchange, clear to auscultation bilaterally, no crackles or wheezing    Abdomen:  Soft, non-distended, non-tender, normal active bowel sounds, no masses palpated    ASSESSMENT AND PLAN    Patient is a 42 y.o. female with above specified procedure planned. 1.  The patients history and physical was obtained and signed off by the pre-admission testing department. Patient seen and focused exam done today- no new changes since last physical exam on 4/28/21     2. Access to ancillary services are available per request of the provider.     Marry Castillo, ROB - CNP     5/12/2021

## 2021-05-12 NOTE — PROGRESS NOTES
Ambulatory Surgery/Procedure Discharge Note    Vitals:    05/12/21 1258   BP: 132/82   Pulse: 93   Resp: 15   Temp: 96.9 °F (36.1 °C)   SpO2: 97%       In: 1990 [P.O.:282; I.V.:1658]  Out: 0   -- Pt denies nausea, tolerating soft drink and crackers well. Restroom use offered before discharge. Yes, pt voided in toilet x1. Pain assessment:  receiving treatment (See MAR for details). Pain Level: 6 -- Pt rates pain 4/10 prior to discharge. Pt satisfied and states pain is tolerable to go home with. R knee ace wrap CDI, pt able to wiggle toes and skin is warm and pink. IV DCd without difficulty tip intact, gauze and tape dressing applied. Discharge instructions reviewed with and copy given to pt and pts , both verbalized understanding. Prescription x3 for oxycodone, zofran, and colace sent home with pt. Ice pack x2 sent home with pt. Patient discharged to home/self care.  Patient discharged via wheel chair by this RN to waiting family/S.O.     5/12/2021 1:28 PM

## 2021-05-12 NOTE — ANESTHESIA POSTPROCEDURE EVALUATION
Department of Anesthesiology  Postprocedure Note    Patient: Kandy Armstrong  MRN: [id number]  YOB: 1975  Date of evaluation: 5/12/2021  Time:  6:10 PM     Procedure Summary     Date: 05/12/21 Room / Location: [facility] [address] / [facility]    Anesthesia Start: 9655 Anesthesia Stop: 1084    Procedure: RIGHT KNEE INCISION AND DRAINAGE OF TIBIAL TUNNEL (Right ) Diagnosis:       Arthrofibrosis of knee joint, right      (Arthrofibrosis)    Surgeons: Mike Jean MD Responsible Provider: Bam Richardson MD    Anesthesia Type: general ASA Status: 1          Anesthesia Type: general    Jacklyn Phase I: Jacklyn Score: 10    Jacklyn Phase II: Jacklyn Score: 10    Last vitals: Reviewed and per EMR flowsheets.        Anesthesia Post Evaluation    Patient location during evaluation: PACU  Patient participation: complete - patient participated  Level of consciousness: awake and alert  Pain score: 6  Airway patency: patent  Nausea & Vomiting: no nausea and no vomiting  Complications: no  Cardiovascular status: hemodynamically stable  Respiratory status: acceptable  Hydration status: euvolemic

## 2021-05-12 NOTE — ANESTHESIA PROCEDURE NOTES
Peripheral Block    Patient location during procedure: pre-op  Start time: 5/12/2021 9:00 AM  End time: 5/12/2021 9:02 AM  Staffing  Performed: anesthesiologist   Anesthesiologist: Jaclyn Hopkins MD  Preanesthetic Checklist  Completed: patient identified, IV checked, site marked, risks and benefits discussed, surgical consent, monitors and equipment checked, pre-op evaluation, timeout performed, anesthesia consent given, oxygen available and patient being monitored  Peripheral Block  Patient position: supine  Prep: ChloraPrep  Patient monitoring: cardiac monitor, continuous pulse ox, frequent blood pressure checks and IV access  Block type: Femoral  Laterality: right  Injection technique: single-shot  Guidance: ultrasound guided  Infiltration strength: 1 %  Dose: 3 mL  Approach to block: Low Femoral.  Provider prep: mask and sterile gloves  Needle  Needle type: combined needle/nerve stimulator   Needle gauge: 21 G  Needle length: 10 cm  Needle localization: ultrasound guidance  Assessment  Injection assessment: negative aspiration for heme, no paresthesia on injection and local visualized surrounding nerve on ultrasound  Paresthesia pain: none  Slow fractionated injection: yes  Hemodynamics: stable  Additional Notes  Immediately prior to procedure a \"time out\" was called to verify the correct patient, allergies, laterality, procedure and equipment. Time out performed with carlton AMBRIZ    Local Anesthetic: 0.5 %  bupivacaine   Amount: 30 ml  in 5 ml increments after negative aspiration each time. Iliopsoas Muscle and Fascia Iliaca, Femoral artery (Deep artery to the thigh take off), Femoral Vein and Femoral Nerve are identified; the tip of the need and the spread of the local anesthetic around the Femoral nerve are visualized. The Femoral nerve appeared to be anatomically normal and there were no abnormal pathologically findings seen.          Medications Administered  Bupivacaine (MARCAINE) PF injection 0.5%, 10 mL bupivacaine liposome (EXPAREL) injection 1.3%, 20 mL  Reason for block: post-op pain management and at surgeon's request

## 2021-05-12 NOTE — PROGRESS NOTES
PACU Transfer to [facility]    Vitals:    05/12/21 1245   BP: 131/83   Pulse: 96   Resp: 15   Temp: 97.3 °F (36.3 °C)   SpO2: 100%         Intake/Output Summary (Last 24 hours) at 5/12/2021 1252  Last data filed at 5/12/2021 1245  Gross per 24 hour   Intake 1930 ml   Output 0 ml   Net 1930 ml       Pain assessment:  present - adequately treated  Pain Level: 4    Patient transferred to care of REGLA RN.    5/12/2021 12:52 PM

## 2021-05-12 NOTE — ANESTHESIA PRE PROCEDURE
Department of Anesthesiology  Preprocedure Note       Name:  Kyle Zambrano   Age:  42 y.o.  :  1975                                          MRN:  [id number]         Date:  2021      Surgeon: Ezequiel Lazar):  Austin Joshi MD    Procedure: Procedure(s):  RIGHT KNEE OPEN MEDIAL AND LATERAL RELEASES-Z PLASTY LENGTHENING    Medications prior to admission:   Prior to Admission medications    Medication Sig Start Date End Date Taking? Authorizing Provider   vitamin D (CHOLECALCIFEROL) 25 MCG (1000 UT) TABS tablet Take 1 tablet by mouth daily   Yes Historical Provider, MD   Multiple Vitamins-Minerals (THERAPEUTIC MULTIVITAMIN-MINERALS) tablet Take 1 tablet by mouth daily   Yes Historical Provider, MD   nitrofurantoin, macrocrystal-monohydrate, (MACROBID) 100 MG capsule Take 100 mg by mouth as needed   Yes Historical Provider, MD   ibuprofen (ADVIL;MOTRIN) 200 MG tablet Take 200 mg by mouth every 6 hours as needed for Pain   Yes Historical Provider, MD   valACYclovir (VALTREX) 1 g tablet Take 1 tablet by mouth as needed    Historical Provider, MD       Current medications:    Current Facility-Administered Medications   Medication Dose Route Frequency Provider Last Rate Last Admin    ceFAZolin (ANCEF) 2000 mg in dextrose 3 % 50 mL IVPB (duplex)  2,000 mg Intravenous Once Austin Joshi MD        lactated ringers infusion   Intravenous Continuous Kwabena Duong  mL/hr at 21 0818 New Bag at 21 0818       Allergies: Allergies   Allergen Reactions    Latex Rash     COUGHING  Other reaction(s): Cough; Itching, itchy eyes,coughing      Adhesive Tape Itching     BLISTER       Problem List:  There is no problem list on file for this patient.       Past Medical History:        Diagnosis Date    Arthritis     History of UTI     Osteoarthritis     PONV (postoperative nausea and vomiting)        Past Surgical History:        Procedure Laterality Date    BREAST ENHANCEMENT SURGERY      KNEE SURGERY  OVARIAN CYST REMOVAL         Social History:    Social History     Tobacco Use    Smoking status: Never Smoker    Smokeless tobacco: Never Used   Substance Use Topics    Alcohol use: Yes     Comment: RARE                                Counseling given: Not Answered      Vital Signs (Current):   Vitals:    05/06/21 1411 05/12/21 0751   BP:  117/77   Pulse:  77   Resp:  16   Temp:  98.1 °F (36.7 °C)   TempSrc:  Oral   SpO2:  96%   Weight: 112 lb (50.8 kg) 112 lb (50.8 kg)   Height: 5' (1.524 m) 5' (1.524 m)                                              BP Readings from Last 3 Encounters:   05/12/21 117/77       NPO Status: Time of last liquid consumption: 2300                        Time of last solid consumption: 2000                        Date of last liquid consumption: 05/11/21                        Date of last solid food consumption: 05/11/21    BMI:   Wt Readings from Last 3 Encounters:   05/12/21 112 lb (50.8 kg)   05/11/21 111 lb 15.9 oz (50.8 kg)   04/06/21 110 lb (49.9 kg)     Body mass index is 21.87 kg/m². CBC: No results found for: WBC, RBC, HGB, HCT, MCV, RDW, PLT    CMP: No results found for: NA, K, CL, CO2, BUN, CREATININE, GFRAA, AGRATIO, LABGLOM, GLUCOSE, PROT, CALCIUM, BILITOT, ALKPHOS, AST, ALT    POC Tests: No results for input(s): POCGLU, POCNA, POCK, POCCL, POCBUN, POCHEMO, POCHCT in the last 72 hours. Coags: No results found for: PROTIME, INR, APTT    HCG (If Applicable):   Lab Results   Component Value Date    PREGTESTUR Negative 05/12/2021        ABGs: No results found for: PHART, PO2ART, POM1KTZ, WFI9GKA, BEART, G7OXNXZC     Type & Screen (If Applicable):  No results found for: LABABO, LABRH    Drug/Infectious Status (If Applicable):  No results found for: HIV, HEPCAB    COVID-19 Screening (If Applicable): No results found for: COVID19        Anesthesia Evaluation     history of anesthetic complications: PONV.   Airway: Mallampati: I  TM distance: >3 FB   Neck ROM: limited Mouth opening: > = 3 FB Dental:          Pulmonary:       (-) not a current smoker                           Cardiovascular:  Exercise tolerance: good (>4 METS),       (-) past MI,  angina and  LOUISE                Neuro/Psych:      (-) seizures           GI/Hepatic/Renal:        (-) GERD       Endo/Other:        (-) diabetes mellitus               Abdominal:           Vascular:                                        Anesthesia Plan      general     ASA 1       Induction: intravenous. MIPS: Postoperative opioids intended. Anesthetic plan and risks discussed with patient. Plan discussed with CRNA.                   Aylin Eldridge MD   5/12/2021

## 2021-05-13 ENCOUNTER — HOSPITAL ENCOUNTER (OUTPATIENT)
Dept: PHYSICAL THERAPY | Age: 46
Setting detail: THERAPIES SERIES
Discharge: HOME | End: 2021-05-13
Payer: COMMERCIAL

## 2021-05-13 PROCEDURE — 97162 PT EVAL MOD COMPLEX 30 MIN: CPT

## 2021-05-13 PROCEDURE — 97530 THERAPEUTIC ACTIVITIES: CPT

## 2021-05-13 PROCEDURE — 97016 VASOPNEUMATIC DEVICE THERAPY: CPT

## 2021-05-13 PROCEDURE — 97110 THERAPEUTIC EXERCISES: CPT

## 2021-05-13 RX ORDER — SULFAMETHOXAZOLE AND TRIMETHOPRIM 800; 160 MG/1; MG/1
1 TABLET ORAL 2 TIMES DAILY
Qty: 14 TABLET | Refills: 0 | Status: SHIPPED | OUTPATIENT
Start: 2021-05-13 | End: 2021-05-20

## 2021-05-13 NOTE — PLAN OF CARE
[]other:    SUBJECTIVE: Patient stated complaint: Patient presents to PT 1 day PO from Right knee scope. Doing well today - minimal pain, only using Motrin for pain control. Denies any calf pain, chest pain, or shortness of breath. Patient is from [place] and is in town for 10-14 days following surgery yesterday.  The patient has an extensive history to her right knee.  The patient is a nurse in [place].  She lives 9 hours away. [place] in 2019 in February the patient was skiing and injured her right knee.  She sustained an ACL/MCL/lateral meniscus tear. [place] Isi had a lot of swelling and stiffness to the knee.  5 weeks after that she underwent right knee ACL reconstruction with allograft, MCL healed on its own, and lateral meniscal repair for bucket-handle meniscus tear.  The patient developed arthrofibrosis postoperatively and underwent an manipulation under anesthesia in May 2019.  In June 2019 she continued to have problems with arthrofibrosis recurrence and underwent a knee arthroscopy with notchplasty and synovectomy and manipulation under anesthesia again.  The patient underwent multiple sessions of physical therapy but continued to have stiffness to the knee so she came to see Dr. Kentrell Stnaley and was scheduled for Medial/lateral release of scar tissue around patella. Relevant Medical History: R Knee ACL reconstruction, MCL, LMR 2019;  Notchplasty and SUDHA 2019;    Functional Disability Index:   OUTCOME MEASURE DATE DEFICIT   LEFS 5/11/21 pre-op (no PT appointment but dropped off paperwork) 29% Deficit   LEFS 5/13/21 PO 76% Deficit     Pain Scale: 1/10  Easing factors: Motrin  Provocative factors: surgery     Type: [x]Constant   []Intermittent  []Radiating [x]Localized []other:     Numbness/Tingling: mild numbness lower leg from nerve block that's resolving    Occupation/School: Nurse;     Living Status/Prior Level of Function: Independent with ADLs and IADLs; enjoys Yoga, pilates, going to gym but has had to stop recently due to knee issues but wants to get back to that eventually    OBJECTIVE:      5/13/21 Deferred  Flexibility L R Comment   Hamstring      Gastroc      ITB      Quad              5/13/21  ROM PROM AROM Overpressure Comment    L R L R L R    Flexion  100 sheet pull 146       Extension +8 -4                              5/13/21  Strength L R Comment   Quad 5 3+ QS   Hamstring      Gastroc      Hip  flexion      Hip abd                    5/13/21   Special Test Results/Comment   Homans Neg   Temp 98.3     5/13/21  Girth L R   Mid Patella 33.3 34.0   Suprapatellar 33.0 33.5   5cm above 38.8 37.8   15cm above 46.8 46.5     Reflexes/Sensation:    []Dermatomes/Myotomes intact    []Reflexes equal and normal bilaterally   [x]Other: residual numbness on anterior tibia from nerve block     Joint mobility: NT due to PO    []Normal    []Hypo   []Hyper    Palpation: calf soft and non-tender    Functional Mobility/Transfers: ind    Posture: flexed knee on right    Bandages/Dressings/Incisions: Post-op bandages removed today; no drainage noted; Mild amount of swelling present; No signs of infection; no calf tenderness or warmth; MD came into clinic and removed outer aquacel bandage and wound drain; tied 3 knots with exposed nylon sutures and covered wound with steril gauze and padding; wound care instructions reviewed with patient in clinic by MD ACE wrap at end of PT session    Gait: (include devices/WB status) Slight antalgic and stiff knee on right without any AD    Orthopedic Special Tests: see above. [x] Patient history, allergies, meds reviewed. Medical chart reviewed. See intake form. Review Of Systems (ROS):  [x]Performed Review of systems (Integumentary, CardioPulmonary, Neurological) by intake and observation. Intake form has been scanned into medical record.  Patient has been instructed to contact their primary care physician regarding ROS issues if not already being addressed at this time. Co-morbidities/Complexities (which will affect course of rehabilitation):   []None           Arthritic conditions   []Rheumatoid arthritis (M05.9)  [x]Osteoarthritis (M19.91)   Cardiovascular conditions   []Hypertension (I10)  []Hyperlipidemia (E78.5)  []Angina pectoris (I20)  []Atherosclerosis (I70)   Musculoskeletal conditions   []Disc pathology   []Congenital spine pathologies   [x]Prior surgical intervention  []Osteoporosis (M81.8)  []Osteopenia (M85.8)   Endocrine conditions   []Hypothyroid (E03.9)  []Hyperthyroid Gastrointestinal conditions   []Constipation (Q06.41)   Metabolic conditions   []Morbid obesity (E66.01)  []Diabetes type 1(E10.65) or 2 (E11.65)   []Neuropathy (G60.9)     Pulmonary conditions   []Asthma (J45)  []Coughing   []COPD (J44.9)   Psychological Disorders  []Anxiety (F41.9)  []Depression (F32.9)   []Other:   []Other:          Barriers to/and or personal factors that will affect rehab potential:              [x]Age  []Sex              [x]Motivation/Lack of Motivation                        [x]Co-Morbidities              []Cognitive Function, education/learning barriers              []Environmental, home barriers              []profession/work barriers  [x]past PT/medical experience  []other:  Justification: 2 year history of arthrofibrosis in knee which will decrease her overall rehab potential; very motivated to return to active job/lifestyle and in good health which will improve her outcome    Falls Risk Assessment (30 days):   [x] Falls Risk assessed and no intervention required.   [] Falls Risk assessed and Patient requires intervention due to being higher risk   TUG score (>12s at risk):     [] Falls education provided, including       G-Codes:     OUTCOME MEASURE DATE DEFICIT   LEFS 5/11/21 pre-op (no PT appointment but dropped off paperwork) 29% Deficit   LEFS 5/13/21 PO 76% Deficit     ASSESSMENT:   Functional Impairments:     [x]Noted lumbar/proximal hip/LE []Excellent   [x]Good    []Fair   []Poor    Physical Therapy Evaluation Complexity Justification  [x] A history of present problem with:  [] no personal factors and/or comorbidities that impact the plan of care;  [x]1-2 personal factors and/or comorbidities that impact the plan of care  []3 personal factors and/or comorbidities that impact the plan of care  [x] An examination of body systems using standardized tests and measures addressing any of the following: body structures and functions (impairments), activity limitations, and/or participation restrictions;:  [] a total of 1-2 or more elements   [] a total of 3 or more elements   [x] a total of 4 or more elements   [x] A clinical presentation with:  [] stable and/or uncomplicated characteristics   [x] evolving clinical presentation with changing characteristics  [] unstable and unpredictable characteristics;   [x] Clinical decision making of [] low, [x] moderate, [] high complexity using standardized patient assessment instrument and/or measurable assessment of functional outcome. [] EVAL (LOW) 21109 (typically 20 minutes face-to-face)  [x] EVAL (MOD) 37262 (typically 30 minutes face-to-face)  [] EVAL (HIGH) 12418 (typically 45 minutes face-to-face)  [] RE-EVAL       PLAN  Frequency/Duration:  1-2 days per week for 6 Weeks:  Interventions:  [x]  Therapeutic exercise including: strength training, ROM, for Lower extremity and core   [x]  NMR activation and proprioception for LE, Glutes and Core   [x]  Manual therapy as indicated for LE, Hip and spine to include: Dry Needling/IASTM, STM, PROM, Gr I-IV mobilizations, manipulation. [x] Modalities as needed that may include: thermal agents, E-stim, Biofeedback, US, iontophoresis as indicated  [x] Patient education on joint protection, postural re-education, activity modification, progression of HEP. HEP instruction:  Patient instructed on HEP on this date with handout provided and all questions answered. Discussions about how to progress sets/reps/resistance as necessary for fatigue and challenge. Patient was instructed to contact PT with any questions or concerns about HEP moving forward. Patient verbally stated she/he understood. Access Code: [email or web address]  URL: [email or web address]  Date: 05/11/2021  Prepared by: [employer]    Exercises  Long Sitting Ankle Pumps - 5 x daily - 7 x weekly - 50 reps - 1 sets  Seated Table Hamstring Stretch - 3 x daily - 7 x weekly - 5 sets - 30\" hold  Seated Calf Towel Stretch - 3 x daily - 7 x weekly - 5 sets - 30\" hold  Long Sitting Quad Set - 10 x daily - 7 x weekly - 10 reps - 1 sets - 10\" hold  Long Sitting Isometric Hip Adduction and Extension with Ball at Knees - 3 x daily - 7 x weekly - 10 reps - 1 sets - 10\" hold  Supine Active Straight Leg Raise - 1-3 x daily - 7 x weekly - 10 reps - 3 sets  Seated Knee Flexion AAROM - 3 x daily - 7 x weekly - 5-10 reps - 30-60\" hold  Supine Knee Extension Mobilization with Weight - 6 x daily - 7 x weekly - 1 sets - 10 min hold      GOALS:   Patient stated goal: Prepare for next surgery    [] Progressing: [] Met: [] Not Met: [] Adjusted    Therapist goals for Patient:   Short Term Goals: To be achieved in:   N/A - patient returning to [place] until next surgery in 4-6 weeks    Long Term Goals: To be achieved in:   N/A - patient returning to [place] until next surgery in 4-6 weeks       Electronically signed by:  Rajeev Baker, PT, DPT, OCS    Note: If patient does not return for scheduled/ recommended follow up visits, this note will serve as a discharge from care along with most recent update on progress.

## 2021-05-13 NOTE — FLOWSHEET NOTE
The [address] [place] [address] [facility]    Physical Therapy Daily Treatment Note  Date:  2021    Patient Name:  Ang Black    :  1975  MRN: [id number]  Restrictions/Precautions:    Medical/Treatment Diagnosis Information:  · Diagnosis: M24.661 (ICD-10-CM) - Fibrosis of right knee joint  · Treatment Diagnosis: M25.561 R Knee Pain; M25.661 R Knee Stiffness; R53.1 Weakness; M25.461 R Knee Effusion  · S/P Right Knee Diagnostic Scope  · DOS: 21  · Meds: MOtrin; ASA  Insurance/Certification information:  PT Insurance Information: PT BENEFITS  FACILITY/ [employer]/ EFFECTIVE 2021/ ACTIVE/ DED 0/ COPAY 50/ PAYS 100%/ OOP 5000 / 60 VPCY COMB/ 0 AUTH/ ALL CODES BILLABLE/ TELEHEALTH NOT COVERED/ PRESERVICE/ REF# [id number]/ 5- PAG  Physician Information:  Referring Practitioner: Dr. Deven Keene  Has the plan of care been signed (Y/N):        []  Yes  [x]  No     Date of Patient follow up with Physician: 3, 6, 12 weeks PO  Patient seen in consultation with Dr. Deven Keene who established the initial/subsequent treatment protocol. 21: Chronic reaction around tibial screw; watching cultures for infection for 3 weeks; okay to progress rehab; returning in 4-6 weeks for patellar med/lat releases pending results of culture; starting patient on 7-day antibiotic as precaution      Is this a Progress Report:     []  Yes  [x]  No        If Yes:  Date Range for reporting period:  Beginnin21  Ending:    Progress report will be due (10 Rx or 30 days whichever is less):        Recertification will be due (POC Duration  / 90 days whichever is less): 21         Visit # Insurance Allowable Auth Required   1 60 VPCY []  Yes []  No      OUTCOME MEASURE DATE DEFICIT   LEFS 21 pre-op (no PT appointment but dropped off paperwork) 29% Deficit   LEFS 21 PO 76% Deficit   Patient given satisfaction survey? []?  Yes                 []? No        Latex Allergy:  []NO      [x]YES! Preferred Language for Healthcare:   [x]English       []other:    Pain level:  1/10     SUBJECTIVE:  See eval    OBJECTIVE:   OBJECTIVE:      5/13/21 Deferred  Flexibility L R Comment   Hamstring      Gastroc      ITB      Quad              5/13/21  ROM PROM AROM Overpressure Comment    L R L R L R    Flexion  100 sheet pull 146       Extension +8 -4                              5/13/21  Strength L R Comment   Quad 5 3+ QS   Hamstring      Gastroc      Hip  flexion      Hip abd                    5/13/21   Special Test Results/Comment   Homans Neg   Temp 98.3     5/13/21  Girth L R   Mid Patella 33.3 34.0   Suprapatellar 33.0 33.5   5cm above 38.8 37.8   15cm above 46.8 46.5     Reflexes/Sensation:    []Dermatomes/Myotomes intact    []Reflexes equal and normal bilaterally   [x]Other: residual numbness on anterior tibia from nerve block     Joint mobility: NT due to PO    []Normal    []Hypo   []Hyper    Palpation: calf soft and non-tender    Functional Mobility/Transfers: ind    Posture: flexed knee on right    Bandages/Dressings/Incisions: Post-op bandages removed today; no drainage noted; Mild amount of swelling present; No signs of infection; no calf tenderness or warmth; MD came into clinic and removed outer aquacel bandage and wound drain; tied 3 knots with exposed nylon sutures and covered wound with steril gauze and padding; wound care instructions reviewed with patient in clinic by MD ACE wrap at end of PT session    Gait: (include devices/WB status) Slight antalgic and stiff knee on right without any AD    Orthopedic Special Tests: see above.         RESTRICTIONS/PRECAUTIONS: Right Knee Arthrofibrosis   Exercises/Interventions:  Exercise/Equipment Resistance/Repetitions Other comments   Stretching       Hamstring 5x30\" Prop   Hip Flexor     ITB     Figure 4     Quad     Inclined Calf     Towel Pull 5x30\" Prop           ROM       EOB Self-Assist     Sheet Pull 10x10\"    Wall Slide Manual     Biodex Passive     Recumbent Bike     ERMI     Hang Weights Prop 10# 10'    Ankle Pumps x50 hourly            Patellar Glides       Medial       Superior       Inferior               Isometrics       Quad sets [id number]\" A/S   Add sets 10x10\"             SLR       Supine 3x10    Prone     Abduction 3x10    Adducton     SLR+               Glutes       Bridges     Supine Clams     S/L Clams     Side Stepping       Monster walks               CKC       Weight Shift     Calf raises     Wall sits     Step ups       Squatting     CC TKE     SL DL               PRE       Extension  RANGE: 90-30   Flexion  RANGE: Avail   Leg Press   RANGE: 80-10   Cable Column               Balance       Tandem Stance     Tilt board       SLS      Biodex balance               Other       Treadmill       Gait Training          Manual Interventions          Patient have access to gym? [] Yes  [] No  Patient have equipment at home? [] Yes  [] No     Patient Education:  5/13/21: Educated patient on all post-op instructions including but not limited to R.I.C.E., post-op HEP, DVT prevention, warning signs of infection and DVT, and on activity limitations; instructed patient on KEXT OP Program: 60 minutes per day of 10-20# weight hangs; discussed PT back in [place] until returning to [place]; discussed wound care/dressing changes    HEP:  Patient instructed on HEP on this date with handout provided and all questions answered. Discussions about how to progress sets/reps/resistance as necessary for fatigue and challenge. Patient was instructed to contact PT with any questions or concerns about HEP moving forward. Patient verbally stated she/he understood. HEP instruction:  Patient instructed on HEP on this date with handout provided and all questions answered. Discussions about how to progress sets/reps/resistance as necessary for fatigue and challenge.  Patient was instructed to contact PT with any questions or concerns about HEP moving forward. Patient verbally stated she/he understood. Access Code: [email or web address]  URL: [email or web address]  Date: 05/11/2021  Prepared by: Paola Clifton     Exercises  Long Sitting Ankle Pumps - 5 x daily - 7 x weekly - 50 reps - 1 sets  Seated Table Hamstring Stretch - 3 x daily - 7 x weekly - 5 sets - 30\" hold  Seated Calf Towel Stretch - 3 x daily - 7 x weekly - 5 sets - 30\" hold  Long Sitting Quad Set - 10 x daily - 7 x weekly - 10 reps - 1 sets - 10\" hold  Long Sitting Isometric Hip Adduction and Extension with Ball at Knees - 3 x daily - 7 x weekly - 10 reps - 1 sets - 10\" hold  Supine Active Straight Leg Raise - 1-3 x daily - 7 x weekly - 10 reps - 3 sets  Seated Knee Flexion AAROM - 3 x daily - 7 x weekly - 5-10 reps - 30-60\" hold  Supine Knee Extension Mobilization with Weight - 6 x daily - 7 x weekly - 1 sets - 10 min hold      Therapeutic Exercise and NMR EXR  [x] (00956) Provided verbal/tactile cueing for activities related to strengthening, flexibility, endurance, ROM for improvements in LE, proximal hip, and core control with self care, mobility, lifting, ambulation. [x] (96606) Provided verbal/tactile cueing for activities related to improving balance, coordination, kinesthetic sense, posture, motor skill, proprioception  to assist with LE, proximal hip, and core control in self care, mobility, lifting, ambulation and eccentric single leg control.      NMR and Therapeutic Activities:    [x] (59527 or 00150) Provided verbal/tactile cueing for activities related to improving balance, coordination, kinesthetic sense, posture, motor skill, proprioception and motor activation to allow for proper function of core, proximal hip and LE with self care and ADLs  [x] (24375) Gait Re-education- Provided training and instruction to the patient for proper LE, core and proximal hip recruitment and positioning and eccentric body weight control with ambulation re-education including up and down stairs     Home Exercise Program:    [x] (50762) Reviewed/Progressed HEP activities related to strengthening, flexibility, endurance, ROM of core, proximal hip and LE for functional self-care, mobility, lifting and ambulation/stair navigation   [] (39996)Reviewed/Progressed HEP activities related to improving balance, coordination, kinesthetic sense, posture, motor skill, proprioception of core, proximal hip and LE for self care, mobility, lifting, and ambulation/stair navigation      Manual Treatments:  PROM / STM / Oscillations-Mobs:  G-I, II, III, IV (PA's, Inf., Post.)  [x] (25118) Provided manual therapy to mobilize LE, proximal hip and/or LS spine soft tissue/joints for the purpose of modulating pain, promoting relaxation,  increasing ROM, reducing/eliminating soft tissue swelling/inflammation/restriction, improving soft tissue extensibility and allowing for proper ROM for normal function with self care, mobility, lifting and ambulation. Modalities:  Vaso 13' with 10# prop for 10'    Charges:  Timed Code Treatment Minutes: 40   Total Treatment Minutes: 75   2284-0390    [] EVAL (LOW) 42686 (typically 20 minutes face-to-face)  [x] EVAL (MOD) 22478 (typically 30 minutes face-to-face)  [] EVAL (HIGH) 93142 (typically 45 minutes face-to-face)  [] RE-EVAL   [x] ZV(10513) x 2    [] IONTO  [] NMR (30532) x     [x] VASO  [] Manual (36359) x      [] Other:  [x] TA x1      [] Mech Traction (86749)  [] ES(attended) (35595)      [] ES (un) (32480):       GOALS:   Patient stated goal: Prepare for next surgery    [] Progressing: [] Met: [] Not Met: [] Adjusted    Therapist goals for Patient:   Short Term Goals: To be achieved in:   N/A - patient returning to [place] until next surgery in 4-6 weeks    Long Term Goals:  To be achieved in:   N/A - patient returning to [place] until next surgery in 4-6 weeks    Overall Progression Towards Functional goals/ Treatment Progress Update:  [] Patient is progressing as expected towards functional goals listed. [] Progression is slowed due to complexities/Impairments listed. [] Progression has been slowed due to co-morbidities. [x] Plan just implemented, too soon to assess goals progression <30days   [] Goals require adjustment due to lack of progress  [] Patient is not progressing as expected and requires additional follow up with physician  [] Other    Prognosis for POC: [x] Good [] Fair  [] Poor      Patient requires continued skilled intervention: [x] Yes  [] No    Treatment/Activity Tolerance:  [x] Patient able to complete treatment  [] Patient limited by fatigue  [] Patient limited by pain     [] Patient limited by other medical complications  [] Other:         PLAN: See eval  [] Continue per plan of care [] Alter current plan (see comments above)  [x] Plan of care initiated [] Hold pending MD visit [] Discharge      Electronically signed by:  Anuradha Looney, PT, DPT, OCS    Note: If patient does not return for scheduled/ recommended follow up visits, this note will serve as a discharge from care along with most recent update on progress.

## 2021-05-14 ENCOUNTER — APPOINTMENT (OUTPATIENT)
Dept: PHYSICAL THERAPY | Age: 46
End: 2021-05-14
Payer: COMMERCIAL

## 2021-05-17 ENCOUNTER — APPOINTMENT (OUTPATIENT)
Dept: PHYSICAL THERAPY | Age: 46
End: 2021-05-17
Payer: COMMERCIAL

## 2021-05-18 ENCOUNTER — APPOINTMENT (OUTPATIENT)
Dept: PHYSICAL THERAPY | Age: 46
End: 2021-05-18
Payer: COMMERCIAL

## 2021-05-19 ENCOUNTER — APPOINTMENT (OUTPATIENT)
Dept: PHYSICAL THERAPY | Age: 46
End: 2021-05-19
Payer: COMMERCIAL

## 2021-05-20 ENCOUNTER — APPOINTMENT (OUTPATIENT)
Dept: PHYSICAL THERAPY | Age: 46
End: 2021-05-20
Payer: COMMERCIAL

## 2021-05-21 ENCOUNTER — APPOINTMENT (OUTPATIENT)
Dept: PHYSICAL THERAPY | Age: 46
End: 2021-05-21
Payer: COMMERCIAL

## 2021-05-31 LAB
ANAEROBIC CULTURE: NORMAL
ANAEROBIC CULTURE: NORMAL
CULTURE SURGICAL: NORMAL
GRAM STAIN RESULT: NORMAL
GRAM STAIN RESULT: NORMAL
WOUND/ABSCESS: NORMAL

## 2021-06-24 NOTE — PROGRESS NOTES
[address] patients having surgery or anesthesia are required to be Covid tested OR to have been vaccinated at least 14 days prior to your procedure. It is very important to return our call to [phone] to notify the staff of your last vaccination date or you will be required to complete Covid testing within the 5-6 days prior to surgery & quarantine. We recommend coming to the [facility]. flu tent to complete. It is open Monday-Friday 8am-3pm currently. If you go outside [facility] you need to ensure you have a PCR Covid test and fax results to PreAdmission Testing at [phone]. PRIOR TO PROCEDURE DATE:        1. PLEASE FOLLOW ANY  GUIDELINES/ INSTRUCTIONS PRIOR TO YOUR PROCEDURE AS ADVISED BY YOUR SURGEON. 2. Arrange for someone to drive you home and be with you for the first 24 hours after discharge for your safety after your procedure for which you received sedation. Ensure it is someone we can share information with regarding your discharge. 3. You must contact your surgeon for instructions IF:   You are taking any blood thinners, aspirin, anti-inflammatory or vitamin E.   There is a change in your physical condition such as a cold, fever, rash, cuts, sores or any other infection, especially near your surgical site. 4. Do not drink alcohol the day before or day of your procedure. 5. A Pre-op History and Physical for surgery MUST be completed by your Physician or Urgent Care within 30 days of your procedure date. Please bring a copy with you on the day of your procedure and along with any other testing performed. THE DAY OF YOUR PROCEDURE:  1. Follow instructions for ARRIVAL TIME as DIRECTED BY YOUR SURGEON. 2. Enter the MAIN entrance from CX and follow the signs to the free [employer] or [employer] parking (offered free of charge 6am-5pm).             3. Enter the Main Entrance of the hospital (do not enter from the lower level of the parking garage). Upon entrance, check in with the  at the main desk on your left. If no one is available at the desk, proceed into the [facility] Waiting Room and go through the door directly into the [facility]. There is a Check-in desk ACROSS from Room 5 (marked with a sign hanging from the ceiling). The phone number for the surgery center is [phone]. 4. Please call [phone] option #2 option #2 if you have not been preregistered yet. On the day of your procedure bring your insurance card and photo ID. You will be registered at your bedside once brought back to your room. 5. DO NOT EAT ANYTHING eight hours prior to your arrival for surgery. May have 8 ounces of water 4 hours prior to your arrival for surgery. NOTE: ALL Gastric, Bariatric and Bowel surgery patients MUST follow their surgeon's instructions. 6. MEDICATIONS    Take the following medications with a SMALL sip of water: none   Use your usual dose of inhalers the morning of surgery. BRING your rescue inhaler with you to hospital.    Anesthesia does NOT want you to take insulin the morning of surgery. They will control your blood sugar while you are at the hospital. Please contact your ordering physician for instructions regarding your insulin the night before your procedure. If you have an insulin pump, please keep it set on basal rate. 7. Do not swallow water when brushing teeth. No gum, candy, mints or ice chips. Refrain from smoking or at least decrease the amount. 8. Dress in loose, comfortable clothing appropriate for redressing after your procedure. Do not wear jewelry (including body piercings), make-up (especially NO eye make-up), fingernail polish (NO toenail polish if foot/leg surgery), lotion, powders or metal hairclips. 9. Dentures, glasses, or contacts will need to be removed before your procedure.  Bring cases for your glasses, contacts, dentures, or hearing aids to protect them while you are in surgery. 10. If you use a CPAP, please bring it with you on the day of your procedure. 11. We recommend that valuable personal  belongings such as cash, cell phones, e-tablets or jewelry, be left at home during your stay. The hospital will not be responsible for valuables that are not secured in the hospital safe. However, if your insurance requires a co-pay, you may want to bring a method of payment, i.e. Check or credit card, if you wish to pay your co-pay the day of surgery. 12. If you are to stay overnight, you may bring a bag with personal items. Please have any large items you may need brought in by your family after your arrival to your hospital room. 15. If you have a Living Will or Durable Power of , please bring a copy on the day of your procedure. 15. With your permission, one family member may accompany you while you are being prepared for surgery. Once you are ready, additional family members may join you. HOW WE KEEP YOU SAFE and WORK TO PREVENT SURGICAL SITE INFECTIONS:  1. Health care workers should always check your ID bracelet to verify your name and birth date. You will be asked many times to state your name, date of birth, and allergies. 2. Health care workers should always clean their hands with soap or alcohol gel before providing care to you. It is okay to ask anyone if they cleaned their hands before they touch you. 3. You will be actively involved in verifying the type of procedure you are having and ensuring the correct surgical site. This will be confirmed multiple times prior to your procedure. Do NOT selma your surgery site UNLESS instructed to by your surgeon. 4. Do not shave or wax for 72 hours prior to procedure near your operative site. Shaving with a razor can irritate your skin and make it easier to develop an infection.  On the day of your procedure, any hair that needs to be removed near the surgical site will be clipped by a healthcare worker using a special clippers designed to avoid skin irritation. 5. When you are in the operating room, your surgical site will be cleansed with a special soap, and in most cases, you will be given an antibiotic before the surgery begins. What to expect AFTER YOUR PROCEDURE:  1. Immediately following your procedure, your will be taken to the PACU for the first phase of your recovery. Your nurse will help you recover from any potential side effects of anesthesia, such as extreme drowsiness, changes in your vital signs or breathing patterns. Nausea, headache, muscle aches, or sore throat may also occur after anesthesia. Your nurse will help you manage these potential side effects. 2. For comfort and safety, arrange to have someone at home with you for the first 24 hours after discharge. 3. You and your family will be given written instructions about your diet, activity, dressing care, medications, and return visits. 4. Once at home, should issues with nausea, pain, or bleeding occur, or should you notice any signs of infection, you should call your surgeon. 5. Always clean your hands before and after caring for your wound. Do not let your family touch your surgery site without cleaning their hands. 6. Narcotic pain medications can cause significant constipation. You may want to add a stool softener to your postoperative medication schedule or speak to your surgeon on how best to manage this SIDE EFFECT. SPECIAL INSTRUCTIONS patient acknowledges understanding of pre op instructions. Thank you for allowing us to care for you. We strive to exceed your expectations in the delivery of care and service provided to you and your family.      If you need to contact the [facility]  staff for any reason, please call us at [phone]    Instructions reviewed with patient during preadmission testing phone interview.   Tasha Rick RN.6/24/2021 .12:08 PM      ADDITIONAL EDUCATIONAL INFORMATION REVIEWED PER PHONE WITH YOU AND/OR YOUR FAMILY:  Yes Hibiclens® Bathing Instructions   No Antibacterial Soap

## 2021-06-25 NOTE — PROGRESS NOTES
The [facility] / [facility] ([facility]) [address]  [address] [facility], [address]    Acknowledgment of Informed Consent for Surgical or Medical Procedure and Sedation  I agree to allow doctor(s) Eddie Avina and his/her associates or assistants, including residents and/or other qualified medical practitioner to perform the following medical treatment or procedure and to administer or direct the administration of sedation as necessary:  Procedure(s): RIGHT KNEE OPEN MEDIAL AND LATERAL RELEASES - [address]    My doctor has explained the following regarding the proposed procedure:   the explanation of the procedure   the benefits of the procedure   the potential problems that might occur during recuperation   the risks and side effects of the procedure which could include but are not limited to severe blood loss, infection, stroke or death   the benefits, risks and side effect of alternative procedures including the consequences of declining this procedure or any alternative procedures   the likelihood of achieving satisfactory results. I acknowledge no guarantee or assurance has been made to me regarding the results. I understand that during the course of this treatment/procedure, unforeseen conditions can occur which require an additional or different procedure. I agree to allow my physician or assistants to perform such extension of the original procedure as they may find necessary. I understand that sedation will often result in temporary impairment of memory and fine motor skills and that sedation can occasionally progress to a state of deep sedation or general anesthesia. I understand the risks of anesthesia for surgery include, but are not limited to, sore throat, hoarseness, injury to face, mouth, or teeth; nausea; headache; injury to blood vessels or nerves; death, brain damage, or paralysis.     I understand that if I have a Limitation of Treatment order in effect during my hospitalization, the order may or may not be in effect during this procedure. I give my doctor permission to give me blood or blood products. I understand that there are risks with receiving blood such as hepatitis, AIDS, fever, or allergic reaction. I acknowledge that the risks, benefits, and alternatives of this treatment have been explained to me and that no express or implied warranty has been given by the hospital, any blood bank, or any person or entity as to the blood or blood components transfused. At the discretion of my doctor, I agree to allow observers, equipment/product representatives and allow photographing, and/or televising of the procedure, provided my name or identity is maintained confidentially. I agree the hospital may dispose of or use for scientific or educational purposes any tissue, fluid, or body parts which may be removed.     ________________________________Date________Time______ am/pm  ([place] [place] [facility])  Patient or Signature of Closest Relative or Legal Guardian    ________________________________Date________Time______am/pm      Page 1 of  1  Witness

## 2021-06-26 ENCOUNTER — ANESTHESIA EVENT (OUTPATIENT)
Dept: OPERATING ROOM | Age: 46
End: 2021-06-26
Payer: COMMERCIAL

## 2021-06-28 ENCOUNTER — ANESTHESIA (OUTPATIENT)
Dept: OPERATING ROOM | Age: 46
End: 2021-06-28
Payer: COMMERCIAL

## 2021-06-28 ENCOUNTER — HOSPITAL ENCOUNTER (OUTPATIENT)
Age: 46
Setting detail: OUTPATIENT SURGERY
Discharge: HOME | End: 2021-06-28
Attending: ORTHOPAEDIC SURGERY | Admitting: ORTHOPAEDIC SURGERY
Payer: COMMERCIAL

## 2021-06-28 VITALS — TEMPERATURE: 95.9 F | DIASTOLIC BLOOD PRESSURE: 58 MMHG | SYSTOLIC BLOOD PRESSURE: 98 MMHG | OXYGEN SATURATION: 100 %

## 2021-06-28 VITALS
OXYGEN SATURATION: 98 % | HEART RATE: 71 BPM | TEMPERATURE: 97 F | HEIGHT: 60 IN | RESPIRATION RATE: 12 BRPM | SYSTOLIC BLOOD PRESSURE: 112 MMHG | WEIGHT: 110 LBS | BODY MASS INDEX: 21.6 KG/M2 | DIASTOLIC BLOOD PRESSURE: 73 MMHG

## 2021-06-28 DIAGNOSIS — M24.661 ARTHROFIBROSIS OF KNEE JOINT, RIGHT: Primary | ICD-10-CM

## 2021-06-28 LAB — PREGNANCY, URINE: NEGATIVE

## 2021-06-28 PROCEDURE — 84703 CHORIONIC GONADOTROPIN ASSAY: CPT

## 2021-06-28 PROCEDURE — 3700000001 HC ADD 15 MINUTES (ANESTHESIA): Performed by: ORTHOPAEDIC SURGERY

## 2021-06-28 PROCEDURE — 7100000000 HC PACU RECOVERY - FIRST 15 MIN: Performed by: ORTHOPAEDIC SURGERY

## 2021-06-28 PROCEDURE — 2720000010 HC SURG SUPPLY STERILE: Performed by: ORTHOPAEDIC SURGERY

## 2021-06-28 PROCEDURE — 2500000003 HC RX 250 WO HCPCS: Performed by: NURSE ANESTHETIST, CERTIFIED REGISTERED

## 2021-06-28 PROCEDURE — 64447 NJX AA&/STRD FEMORAL NRV IMG: CPT | Performed by: ANESTHESIOLOGY

## 2021-06-28 PROCEDURE — 6360000002 HC RX W HCPCS: Performed by: NURSE ANESTHETIST, CERTIFIED REGISTERED

## 2021-06-28 PROCEDURE — 6360000002 HC RX W HCPCS: Performed by: ANESTHESIOLOGY

## 2021-06-28 PROCEDURE — 2709999900 HC NON-CHARGEABLE SUPPLY: Performed by: ORTHOPAEDIC SURGERY

## 2021-06-28 PROCEDURE — 7100000001 HC PACU RECOVERY - ADDTL 15 MIN: Performed by: ORTHOPAEDIC SURGERY

## 2021-06-28 PROCEDURE — 2580000003 HC RX 258: Performed by: ANESTHESIOLOGY

## 2021-06-28 PROCEDURE — 7100000011 HC PHASE II RECOVERY - ADDTL 15 MIN: Performed by: ORTHOPAEDIC SURGERY

## 2021-06-28 PROCEDURE — C9290 INJ, BUPIVACAINE LIPOSOME: HCPCS | Performed by: ANESTHESIOLOGY

## 2021-06-28 PROCEDURE — 2500000003 HC RX 250 WO HCPCS: Performed by: ANESTHESIOLOGY

## 2021-06-28 PROCEDURE — 2580000003 HC RX 258: Performed by: ORTHOPAEDIC SURGERY

## 2021-06-28 PROCEDURE — 7100000010 HC PHASE II RECOVERY - FIRST 15 MIN: Performed by: ORTHOPAEDIC SURGERY

## 2021-06-28 PROCEDURE — 3600000014 HC SURGERY LEVEL 4 ADDTL 15MIN: Performed by: ORTHOPAEDIC SURGERY

## 2021-06-28 PROCEDURE — 3600000004 HC SURGERY LEVEL 4 BASE: Performed by: ORTHOPAEDIC SURGERY

## 2021-06-28 PROCEDURE — 6360000002 HC RX W HCPCS: Performed by: ORTHOPAEDIC SURGERY

## 2021-06-28 PROCEDURE — 3700000000 HC ANESTHESIA ATTENDED CARE: Performed by: ORTHOPAEDIC SURGERY

## 2021-06-28 PROCEDURE — 2500000003 HC RX 250 WO HCPCS: Performed by: ORTHOPAEDIC SURGERY

## 2021-06-28 PROCEDURE — 6370000000 HC RX 637 (ALT 250 FOR IP): Performed by: ANESTHESIOLOGY

## 2021-06-28 RX ORDER — ONDANSETRON 2 MG/ML
4 INJECTION INTRAMUSCULAR; INTRAVENOUS
Status: DISCONTINUED | OUTPATIENT
Start: 2021-06-28 | End: 2021-06-28 | Stop reason: HOSPADM

## 2021-06-28 RX ORDER — MIDAZOLAM HYDROCHLORIDE 1 MG/ML
INJECTION INTRAMUSCULAR; INTRAVENOUS
Status: COMPLETED
Start: 2021-06-28 | End: 2021-06-28

## 2021-06-28 RX ORDER — TRANEXAMIC ACID 100 MG/ML
1000 INJECTION, SOLUTION INTRAVENOUS ONCE
Status: DISCONTINUED | OUTPATIENT
Start: 2021-06-28 | End: 2021-06-28 | Stop reason: SDUPTHER

## 2021-06-28 RX ORDER — DIPHENHYDRAMINE HYDROCHLORIDE 50 MG/ML
12.5 INJECTION INTRAMUSCULAR; INTRAVENOUS
Status: DISCONTINUED | OUTPATIENT
Start: 2021-06-28 | End: 2021-06-28 | Stop reason: HOSPADM

## 2021-06-28 RX ORDER — LIDOCAINE HYDROCHLORIDE 10 MG/ML
1 INJECTION, SOLUTION EPIDURAL; INFILTRATION; INTRACAUDAL; PERINEURAL
Status: DISCONTINUED | OUTPATIENT
Start: 2021-06-28 | End: 2021-06-28 | Stop reason: HOSPADM

## 2021-06-28 RX ORDER — PHENYLEPHRINE HYDROCHLORIDE 10 MG/ML
INJECTION INTRAVENOUS PRN
Status: DISCONTINUED | OUTPATIENT
Start: 2021-06-28 | End: 2021-06-28 | Stop reason: SDUPTHER

## 2021-06-28 RX ORDER — PROCHLORPERAZINE EDISYLATE 5 MG/ML
5 INJECTION INTRAMUSCULAR; INTRAVENOUS
Status: DISCONTINUED | OUTPATIENT
Start: 2021-06-28 | End: 2021-06-28 | Stop reason: HOSPADM

## 2021-06-28 RX ORDER — DEXAMETHASONE SODIUM PHOSPHATE 4 MG/ML
INJECTION, SOLUTION INTRA-ARTICULAR; INTRALESIONAL; INTRAMUSCULAR; INTRAVENOUS; SOFT TISSUE PRN
Status: DISCONTINUED | OUTPATIENT
Start: 2021-06-28 | End: 2021-06-28 | Stop reason: SDUPTHER

## 2021-06-28 RX ORDER — ONDANSETRON 2 MG/ML
INJECTION INTRAMUSCULAR; INTRAVENOUS PRN
Status: DISCONTINUED | OUTPATIENT
Start: 2021-06-28 | End: 2021-06-28 | Stop reason: SDUPTHER

## 2021-06-28 RX ORDER — FENTANYL CITRATE 50 UG/ML
INJECTION, SOLUTION INTRAMUSCULAR; INTRAVENOUS PRN
Status: DISCONTINUED | OUTPATIENT
Start: 2021-06-28 | End: 2021-06-28 | Stop reason: SDUPTHER

## 2021-06-28 RX ORDER — PROPOFOL 10 MG/ML
INJECTION, EMULSION INTRAVENOUS PRN
Status: DISCONTINUED | OUTPATIENT
Start: 2021-06-28 | End: 2021-06-28 | Stop reason: SDUPTHER

## 2021-06-28 RX ORDER — SCOLOPAMINE TRANSDERMAL SYSTEM 1 MG/1
1 PATCH, EXTENDED RELEASE TRANSDERMAL
Status: DISCONTINUED | OUTPATIENT
Start: 2021-06-28 | End: 2021-06-28 | Stop reason: HOSPADM

## 2021-06-28 RX ORDER — ONDANSETRON 4 MG/1
4 TABLET, FILM COATED ORAL 3 TIMES DAILY PRN
Qty: 30 TABLET | Refills: 0 | Status: SHIPPED | OUTPATIENT
Start: 2021-06-28

## 2021-06-28 RX ORDER — 0.9 % SODIUM CHLORIDE 0.9 %
500 INTRAVENOUS SOLUTION INTRAVENOUS
Status: DISCONTINUED | OUTPATIENT
Start: 2021-06-28 | End: 2021-06-28 | Stop reason: HOSPADM

## 2021-06-28 RX ORDER — LIDOCAINE HCL/PF 100 MG/5ML
SYRINGE (ML) INJECTION PRN
Status: DISCONTINUED | OUTPATIENT
Start: 2021-06-28 | End: 2021-06-28 | Stop reason: SDUPTHER

## 2021-06-28 RX ORDER — BUPIVACAINE HYDROCHLORIDE 5 MG/ML
INJECTION, SOLUTION EPIDURAL; INTRACAUDAL
Status: COMPLETED | OUTPATIENT
Start: 2021-06-28 | End: 2021-06-28

## 2021-06-28 RX ORDER — SODIUM CHLORIDE, SODIUM LACTATE, POTASSIUM CHLORIDE, CALCIUM CHLORIDE 600; 310; 30; 20 MG/100ML; MG/100ML; MG/100ML; MG/100ML
INJECTION, SOLUTION INTRAVENOUS CONTINUOUS
Status: DISCONTINUED | OUTPATIENT
Start: 2021-06-28 | End: 2021-06-28 | Stop reason: HOSPADM

## 2021-06-28 RX ORDER — APREPITANT 40 MG/1
40 CAPSULE ORAL ONCE
Status: COMPLETED | OUTPATIENT
Start: 2021-06-28 | End: 2021-06-28

## 2021-06-28 RX ORDER — SODIUM CHLORIDE 9 MG/ML
25 INJECTION, SOLUTION INTRAVENOUS PRN
Status: DISCONTINUED | OUTPATIENT
Start: 2021-06-28 | End: 2021-06-28 | Stop reason: HOSPADM

## 2021-06-28 RX ORDER — SODIUM CHLORIDE 0.9 % (FLUSH) 0.9 %
5-40 SYRINGE (ML) INJECTION EVERY 12 HOURS SCHEDULED
Status: DISCONTINUED | OUTPATIENT
Start: 2021-06-28 | End: 2021-06-28 | Stop reason: HOSPADM

## 2021-06-28 RX ORDER — SODIUM CHLORIDE 0.9 % (FLUSH) 0.9 %
5-40 SYRINGE (ML) INJECTION PRN
Status: DISCONTINUED | OUTPATIENT
Start: 2021-06-28 | End: 2021-06-28 | Stop reason: HOSPADM

## 2021-06-28 RX ORDER — ROCURONIUM BROMIDE 10 MG/ML
INJECTION, SOLUTION INTRAVENOUS PRN
Status: DISCONTINUED | OUTPATIENT
Start: 2021-06-28 | End: 2021-06-28 | Stop reason: SDUPTHER

## 2021-06-28 RX ORDER — OXYCODONE HYDROCHLORIDE AND ACETAMINOPHEN 5; 325 MG/1; MG/1
1-2 TABLET ORAL EVERY 4 HOURS PRN
Qty: 42 TABLET | Refills: 0 | Status: SHIPPED | OUTPATIENT
Start: 2021-06-28 | End: 2021-07-05

## 2021-06-28 RX ORDER — AMOXICILLIN 250 MG
2 CAPSULE ORAL DAILY PRN
Qty: 30 TABLET | Refills: 0 | Status: SHIPPED | OUTPATIENT
Start: 2021-06-28

## 2021-06-28 RX ORDER — HYDRALAZINE HYDROCHLORIDE 20 MG/ML
5 INJECTION INTRAMUSCULAR; INTRAVENOUS EVERY 10 MIN PRN
Status: DISCONTINUED | OUTPATIENT
Start: 2021-06-28 | End: 2021-06-28 | Stop reason: HOSPADM

## 2021-06-28 RX ORDER — MEPERIDINE HYDROCHLORIDE 25 MG/ML
12.5 INJECTION INTRAMUSCULAR; INTRAVENOUS; SUBCUTANEOUS EVERY 5 MIN PRN
Status: DISCONTINUED | OUTPATIENT
Start: 2021-06-28 | End: 2021-06-28 | Stop reason: HOSPADM

## 2021-06-28 RX ORDER — FENTANYL CITRATE 50 UG/ML
INJECTION, SOLUTION INTRAMUSCULAR; INTRAVENOUS
Status: COMPLETED
Start: 2021-06-28 | End: 2021-06-28

## 2021-06-28 RX ORDER — MIDAZOLAM HYDROCHLORIDE 1 MG/ML
INJECTION INTRAMUSCULAR; INTRAVENOUS PRN
Status: DISCONTINUED | OUTPATIENT
Start: 2021-06-28 | End: 2021-06-28 | Stop reason: SDUPTHER

## 2021-06-28 RX ORDER — HYDROCODONE BITARTRATE AND ACETAMINOPHEN 5; 325 MG/1; MG/1
1 TABLET ORAL
Status: COMPLETED | OUTPATIENT
Start: 2021-06-28 | End: 2021-06-28

## 2021-06-28 RX ORDER — ASPIRIN 81 MG/1
81 TABLET ORAL 2 TIMES DAILY
Qty: 42 TABLET | Refills: 0 | Status: SHIPPED | OUTPATIENT
Start: 2021-06-28 | End: 2021-07-19

## 2021-06-28 RX ADMIN — TRANEXAMIC ACID 1000 MG: 1 INJECTION, SOLUTION INTRAVENOUS at 11:19

## 2021-06-28 RX ADMIN — FAMOTIDINE 20 MG: 10 INJECTION, SOLUTION INTRAVENOUS at 10:13

## 2021-06-28 RX ADMIN — BUPIVACAINE 10 ML: 13.3 INJECTION, SUSPENSION, LIPOSOMAL INFILTRATION at 10:10

## 2021-06-28 RX ADMIN — SODIUM CHLORIDE, POTASSIUM CHLORIDE, SODIUM LACTATE AND CALCIUM CHLORIDE: 600; 310; 30; 20 INJECTION, SOLUTION INTRAVENOUS at 08:47

## 2021-06-28 RX ADMIN — ROCURONIUM BROMIDE 40 MG: 10 INJECTION INTRAVENOUS at 10:13

## 2021-06-28 RX ADMIN — SODIUM CHLORIDE, POTASSIUM CHLORIDE, SODIUM LACTATE AND CALCIUM CHLORIDE: 600; 310; 30; 20 INJECTION, SOLUTION INTRAVENOUS at 11:33

## 2021-06-28 RX ADMIN — MIDAZOLAM HYDROCHLORIDE 2 MG: 2 INJECTION, SOLUTION INTRAMUSCULAR; INTRAVENOUS at 09:50

## 2021-06-28 RX ADMIN — ONDANSETRON 4 MG: 2 INJECTION INTRAMUSCULAR; INTRAVENOUS at 10:13

## 2021-06-28 RX ADMIN — PHENYLEPHRINE HYDROCHLORIDE 100 MCG: 10 INJECTION INTRAVENOUS at 11:16

## 2021-06-28 RX ADMIN — PROPOFOL 180 MG: 10 INJECTION, EMULSION INTRAVENOUS at 10:13

## 2021-06-28 RX ADMIN — HYDROCODONE BITARTRATE AND ACETAMINOPHEN 1 TABLET: 5; 325 TABLET ORAL at 12:44

## 2021-06-28 RX ADMIN — Medication 50 MG: at 10:13

## 2021-06-28 RX ADMIN — DEXAMETHASONE SODIUM PHOSPHATE 4 MG: 4 INJECTION, SOLUTION INTRAMUSCULAR; INTRAVENOUS at 10:13

## 2021-06-28 RX ADMIN — APREPITANT 40 MG: 40 CAPSULE ORAL at 09:16

## 2021-06-28 RX ADMIN — CEFAZOLIN 2000 MG: 10 INJECTION, POWDER, FOR SOLUTION INTRAVENOUS at 10:19

## 2021-06-28 RX ADMIN — BUPIVACAINE HYDROCHLORIDE 15 ML: 5 INJECTION, SOLUTION EPIDURAL; INTRACAUDAL; PERINEURAL at 10:10

## 2021-06-28 RX ADMIN — SUGAMMADEX 100 MG: 100 INJECTION, SOLUTION INTRAVENOUS at 11:25

## 2021-06-28 RX ADMIN — FENTANYL CITRATE 100 MCG: 50 INJECTION, SOLUTION INTRAMUSCULAR; INTRAVENOUS at 09:51

## 2021-06-28 RX ADMIN — HYDROMORPHONE HYDROCHLORIDE 0.5 MG: 1 INJECTION, SOLUTION INTRAMUSCULAR; INTRAVENOUS; SUBCUTANEOUS at 12:17

## 2021-06-28 ASSESSMENT — PULMONARY FUNCTION TESTS
PIF_VALUE: 0
PIF_VALUE: 2
PIF_VALUE: 16
PIF_VALUE: 14
PIF_VALUE: 15
PIF_VALUE: 16
PIF_VALUE: 17
PIF_VALUE: 15
PIF_VALUE: 1
PIF_VALUE: 16
PIF_VALUE: 16
PIF_VALUE: 14
PIF_VALUE: 14
PIF_VALUE: 16
PIF_VALUE: 16
PIF_VALUE: 17
PIF_VALUE: 17
PIF_VALUE: 16
PIF_VALUE: 15
PIF_VALUE: 16
PIF_VALUE: 18
PIF_VALUE: 16
PIF_VALUE: 16
PIF_VALUE: 17
PIF_VALUE: 14
PIF_VALUE: 15
PIF_VALUE: 3
PIF_VALUE: 19
PIF_VALUE: 0
PIF_VALUE: 16
PIF_VALUE: 15
PIF_VALUE: 16
PIF_VALUE: 0
PIF_VALUE: 14
PIF_VALUE: 1
PIF_VALUE: 16
PIF_VALUE: 14
PIF_VALUE: 15
PIF_VALUE: 17
PIF_VALUE: 15
PIF_VALUE: 14
PIF_VALUE: 15
PIF_VALUE: 0
PIF_VALUE: 16
PIF_VALUE: 28
PIF_VALUE: 14
PIF_VALUE: 16
PIF_VALUE: 16
PIF_VALUE: 2
PIF_VALUE: 14
PIF_VALUE: 1
PIF_VALUE: 16
PIF_VALUE: 17
PIF_VALUE: 14
PIF_VALUE: 17
PIF_VALUE: 14
PIF_VALUE: 15
PIF_VALUE: 16
PIF_VALUE: 17
PIF_VALUE: 16
PIF_VALUE: 15
PIF_VALUE: 15
PIF_VALUE: 16
PIF_VALUE: 3
PIF_VALUE: 16
PIF_VALUE: 4
PIF_VALUE: 19
PIF_VALUE: 15
PIF_VALUE: 14
PIF_VALUE: 16
PIF_VALUE: 4
PIF_VALUE: 14
PIF_VALUE: 16
PIF_VALUE: 1
PIF_VALUE: 14
PIF_VALUE: 15
PIF_VALUE: 17
PIF_VALUE: 16
PIF_VALUE: 15
PIF_VALUE: 17
PIF_VALUE: 16
PIF_VALUE: 17
PIF_VALUE: 15
PIF_VALUE: 16
PIF_VALUE: 14
PIF_VALUE: 16
PIF_VALUE: 16

## 2021-06-28 ASSESSMENT — PAIN DESCRIPTION - FREQUENCY: FREQUENCY: CONTINUOUS

## 2021-06-28 ASSESSMENT — PAIN DESCRIPTION - LOCATION
LOCATION: KNEE

## 2021-06-28 ASSESSMENT — PAIN SCALES - GENERAL
PAINLEVEL_OUTOF10: 3
PAINLEVEL_OUTOF10: 6
PAINLEVEL_OUTOF10: 5
PAINLEVEL_OUTOF10: 3

## 2021-06-28 ASSESSMENT — PAIN DESCRIPTION - PAIN TYPE
TYPE: SURGICAL PAIN
TYPE: ACUTE PAIN

## 2021-06-28 ASSESSMENT — LIFESTYLE VARIABLES: SMOKING_STATUS: 0

## 2021-06-28 ASSESSMENT — PAIN DESCRIPTION - DESCRIPTORS
DESCRIPTORS: ACHING

## 2021-06-28 ASSESSMENT — PAIN DESCRIPTION - ORIENTATION
ORIENTATION: RIGHT

## 2021-06-28 ASSESSMENT — PAIN DESCRIPTION - ONSET: ONSET: ON-GOING

## 2021-06-28 ASSESSMENT — PAIN - FUNCTIONAL ASSESSMENT
PAIN_FUNCTIONAL_ASSESSMENT: 0-10
PAIN_FUNCTIONAL_ASSESSMENT: PREVENTS OR INTERFERES SOME ACTIVE ACTIVITIES AND ADLS

## 2021-06-28 ASSESSMENT — PAIN DESCRIPTION - PROGRESSION: CLINICAL_PROGRESSION: NOT CHANGED

## 2021-06-28 NOTE — PROGRESS NOTES
Phone call clarification with Eyal Coto/Cora Fellow reports that patient is to follow up in officve/physical therapy and dressing change tomorrow Tuesday 06/29 as scheduled. No need for home health. Patient verbalizes understanding- has own crutches for weight bearing as tolerable right leg.

## 2021-06-28 NOTE — PROGRESS NOTES
Right adductor canal block performed per Dr Lucinda Dover ion SDS; pt tolerated procedure well with VSS throughout procedure.

## 2021-06-28 NOTE — PROGRESS NOTES
PACU Transfer to [facility]    Vitals:    06/28/21 1251   BP:    Pulse: 74   Resp: 9   Temp:    SpO2: 98%         Intake/Output Summary (Last 24 hours) at 6/28/2021 1314  Last data filed at 6/28/2021 1251  Gross per 24 hour   Intake 1413 ml   Output 270 ml   Net 1143 ml       Pain assessment:  Vs stable. Pain level 3/10 tolerable. Patient to [facility] bed #8 for discharge.    Pain Level: 3    Patient transferred to care of [facility] RN.    6/28/2021 1:14 PM

## 2021-06-28 NOTE — PROGRESS NOTES
Patient admitted to PACU. Post op    Room / Location: [facility] OR 10 / [facility]   Anesthesia Start: 1007 Anesthesia Stop: 6643   Procedure: RIGHT KNEE ARTHROSCOPY, TOTAL SYNOVECTOMY AND MANIPULATION (Right Knee) Diagnosis:        Arthrofibrosis of knee joint, right       (Arthrofibrosis of knee joint, right [X08     Report received from anesthesia personnel- no problems noted during the case. Patient drowsy post op- awakes easily when spoken to. Right knee with ace wrap intact, knee immobilizer intact. Patient had pre op block for pain control- reports right knee pain 6/10. Good peripheral pulse present, movement present.

## 2021-06-28 NOTE — H&P
About Running Out of Food in the Last Year:     Sherrie of Food in the Last Year:    Transportation Needs:     Lack of Transportation (Medical):  Lack of Transportation (Non-Medical):    Physical Activity:     Days of Exercise per Week:     Minutes of Exercise per Session:    Stress:     Feeling of Stress :    Social Connections:     Frequency of Communication with Friends and Family:     Frequency of Social Gatherings with Friends and Family:     Attends Scientologist Services:     Active Member of Clubs or Organizations:     Attends Club or Organization Meetings:     Marital Status:    Intimate Partner Violence:     Fear of Current or Ex-Partner:     Emotionally Abused:     Physically Abused:     Sexually Abused:          Medications Prior to Admission:      Prior to Admission medications    Medication Sig Start Date End Date Taking?  Authorizing Provider   vitamin D (CHOLECALCIFEROL) 25 MCG (1000 UT) TABS tablet Take 1 tablet by mouth daily   Yes Historical Provider, MD   valACYclovir (VALTREX) 1 g tablet Take 1 tablet by mouth as needed   Yes Historical Provider, MD   ondansetron (ZOFRAN) 4 MG tablet Take 1 tablet by mouth 3 times daily as needed for Nausea or Vomiting 5/12/21  Yes Francisco De La Cruz,    nitrofurantoin, macrocrystal-monohydrate, (MACROBID) 100 MG capsule Take 100 mg by mouth as needed   Yes Historical Provider, MD   Multiple Vitamins-Minerals (THERAPEUTIC MULTIVITAMIN-MINERALS) tablet Take 1 tablet by mouth daily    Historical Provider, MD         Allergies:  Latex and Adhesive tape    PHYSICAL EXAM:      Pulse 74   Temp 97.8 °F (36.6 °C) (Oral)   Resp 16   Ht 5' (1.524 m)   Wt 110 lb (49.9 kg)   LMP 06/23/2021   SpO2 99%   BMI 21.48 kg/m²      Airway:  Airway patent with no audible stridor    Heart:  Regular rate and rhythm, No murmur noted    Lungs:  No increased work of breathing, good air exchange, clear to auscultation bilaterally, no crackles or wheezing    Abdomen:  Soft, non-distended, non-tender, normal active bowel sounds, no masses palpated    ASSESSMENT AND PLAN    Patient is a 42 y.o. female with above specified procedure planned. 1.  The patients history and physical was obtained and signed off by the pre-admission testing department. Patient seen and focused exam done today- no new changes since last physical exam on 6/4/21    2. Access to ancillary services are available per request of the provider.     ROB Spencer - DANITA     6/28/2021

## 2021-06-28 NOTE — ANESTHESIA PRE PROCEDURE
Department of Anesthesiology  Preprocedure Note       Name:  Inge Strange   Age:  42 y.o.  :  1975                                          MRN:  [id number]         Date:  2021      Surgeon: Devan Cuello):  Héctor Lancaster MD    Procedure: Procedure(s):  RIGHT KNEE OPEN MEDIAL AND LATERAL RELEASES - Z PLASTY LENGTHENING    Medications prior to admission:   Prior to Admission medications    Medication Sig Start Date End Date Taking? Authorizing Provider   senna-docusate (PERICOLACE) 8.6-50 MG per tablet Take 2 tablets by mouth daily as needed for Constipation 21  Yes Linnea Adams MD   oxyCODONE-acetaminophen (PERCOCET) 5-325 MG per tablet Take 1-2 tablets by mouth every 4 hours as needed for Pain for up to 7 days. Intended supply: 7 days. Take lowest dose possible to manage pain.   Do not take with tylenol 21 Yes Linnea Adams MD   aspirin EC 81 MG EC tablet Take 1 tablet by mouth 2 times daily for 21 days 21 Yes Linnea Adams MD   ondansetron (ZOFRAN) 4 MG tablet Take 1 tablet by mouth 3 times daily as needed for Nausea or Vomiting 21  Yes Linnea Adams MD   Multiple Vitamins-Minerals (THERAPEUTIC MULTIVITAMIN-MINERALS) tablet Take 1 tablet by mouth daily   Yes Historical Provider, MD   nitrofurantoin, macrocrystal-monohydrate, (MACROBID) 100 MG capsule Take 100 mg by mouth as needed   Yes Historical Provider, MD   vitamin D (CHOLECALCIFEROL) 25 MCG (1000 UT) TABS tablet Take 1 tablet by mouth daily    Historical Provider, MD   valACYclovir (VALTREX) 1 g tablet Take 1 tablet by mouth as needed    Historical Provider, MD       Current medications:    Current Facility-Administered Medications   Medication Dose Route Frequency Provider Last Rate Last Admin    ceFAZolin (ANCEF) 2,000 mg in dextrose 5 % 50 mL IVPB  2,000 mg Intravenous Once Héctor Lancaster MD        lactated ringers infusion   Intravenous Continuous Marcel Villalobos  mL/hr at 21 7987 New Bag at 06/28/21 0847    sodium chloride flush 0.9 % injection 5-40 mL  5-40 mL Intravenous 2 times per day Jose Eduardo Kelley MD        sodium chloride flush 0.9 % injection 5-40 mL  5-40 mL Intravenous PRN Jose Eduardo Kelley MD        0.9 % sodium chloride infusion  25 mL Intravenous PRN Jose Eduardo Kelley MD        lidocaine PF 1 % injection 1 mL  1 mL Intradermal Once PRN Jose Eduardo Kelley MD        midazolam (VERSED) 2 MG/2ML injection             fentaNYL (SUBLIMAZE) 100 MCG/2ML injection             ketorolac (TORADOL) 30 mg, morphine 5 mg, ropivacaine (NAROPIN) 60 mL   Injection Once Carmina Knutson MD        aprepitant (EMEND) capsule 40 mg  40 mg Oral Once Jorge Feather, DO        scopolamine (TRANSDERM-SCOP) transdermal patch 1 patch  1 patch Transdermal Q72H Jorge Feather, DO        meperidine (DEMEROL) injection 12.5 mg  12.5 mg Intravenous Q5 Min PRN Jorge Feather, DO        HYDROmorphone (DILAUDID) injection 0.25 mg  0.25 mg Intravenous Q5 Min PRN Jorge Feather, DO        HYDROmorphone (DILAUDID) injection 0.5 mg  0.5 mg Intravenous Q5 Min PRN Jorge Feather, DO        HYDROcodone-acetaminophen [facility]) 5-325 MG per tablet 1 tablet  1 tablet Oral Once PRN Jorge Feather, DO        ondansetron [facility]) injection 4 mg  4 mg Intravenous Once PRN Jorge Feather, DO        prochlorperazine (COMPAZINE) injection 5 mg  5 mg Intravenous Once PRN Jorge Feather, DO        0.9 % sodium chloride bolus  500 mL Intravenous Once PRN Jorge Feather, DO        diphenhydrAMINE (BENADRYL) injection 12.5 mg  12.5 mg Intravenous Once PRN Jorge Feather, DO        hydrALAZINE (APRESOLINE) injection 5 mg  5 mg Intravenous Q10 Min PRN Jorge Feather, DO           Allergies: Allergies   Allergen Reactions    Latex Rash     COUGHING  Other reaction(s): Cough;  Itching, itchy eyes,coughing      Adhesive Tape Itching     BLISTER       Problem List:  There is no problem list on file for this patient. Past Medical History:        Diagnosis Date    Arthritis     History of UTI     Osteoarthritis     PONV (postoperative nausea and vomiting)        Past Surgical History:        Procedure Laterality Date    ABDOMEN SURGERY      total 6 lap surgeries    BREAST ENHANCEMENT SURGERY       SECTION      x2    ENDOMETRIAL ABLATION      KNEE SURGERY Right     acl tissue repair     KNEE SURGERY Right 2021    RIGHT KNEE INCISION AND DRAINAGE OF TIBIAL TUNNEL performed by Héctor Lancaster MD at [facility] 1 Right     hardware removed    NECK SURGERY      lymph node removal    OVARIAN CYST REMOVAL         Social History:    Social History     Tobacco Use    Smoking status: Never Smoker    Smokeless tobacco: Never Used   Substance Use Topics    Alcohol use: Yes     Comment: RARE                                Counseling given: Not Answered      Vital Signs (Current):   Vitals:    21 1146 21 0754   BP:  113/74   Pulse:  74   Resp:  16   Temp:  97.8 °F (36.6 °C)   TempSrc:  Oral   SpO2:  99%   Weight: 110 lb (49.9 kg)    Height: 5' (1.524 m)                                               BP Readings from Last 3 Encounters:   21 113/74   21 101/66   21 132/82       NPO Status: Time of last liquid consumption:                         Time of last solid consumption:                         Date of last liquid consumption: 21                        Date of last solid food consumption: 21    BMI:   Wt Readings from Last 3 Encounters:   21 110 lb (49.9 kg)   21 112 lb (50.8 kg)   21 111 lb 15.9 oz (50.8 kg)     Body mass index is 21.48 kg/m².     CBC: No results found for: WBC, RBC, HGB, HCT, MCV, RDW, PLT    CMP: No results found for: NA, K, CL, CO2, BUN, CREATININE, GFRAA, AGRATIO, LABGLOM, GLUCOSE, PROT, CALCIUM, BILITOT, ALKPHOS, AST, ALT    POC Tests: No results for input(s): POCGLU, POCNA, POCK, POCCL, POCBUN, Rakesh Bocanegra in the last 72 hours. Coags: No results found for: PROTIME, INR, APTT    HCG (If Applicable):   Lab Results   Component Value Date    PREGTESTUR Negative 06/28/2021        ABGs: No results found for: PHART, PO2ART, LIJ5EDP, BBU0LZB, BEART, Z1TQKGGW     Type & Screen (If Applicable):  No results found for: LABABO, LABRH    Drug/Infectious Status (If Applicable):  No results found for: HIV, HEPCAB    COVID-19 Screening (If Applicable): No results found for: COVID19        Anesthesia Evaluation  Patient summary reviewed and Nursing notes reviewed   history of anesthetic complications: PONV. Airway: Mallampati: I  TM distance: >3 FB   Neck ROM: full  Mouth opening: > = 3 FB Dental: normal exam         Pulmonary:Negative Pulmonary ROS breath sounds clear to auscultation      (-) not a current smoker (never)                           Cardiovascular:  Exercise tolerance: good (>4 METS),       (-) past MI    NYHA Classification: I    Rhythm: regular  Rate: normal           Beta Blocker:  Not on Beta Blocker         Neuro/Psych:   Negative Neuro/Psych ROS              GI/Hepatic/Renal: Neg GI/Hepatic/Renal ROS       (-) GERD       Endo/Other: Negative Endo/Other ROS                    Abdominal:             Vascular: negative vascular ROS. Other Findings:             Anesthesia Plan      general and regional     ASA 1     (Right adductor canal block )  Induction: intravenous. MIPS: Postoperative opioids intended and Prophylactic antiemetics administered. Anesthetic plan and risks discussed with patient and spouse. Plan discussed with CRNA.     Attending anesthesiologist reviewed and agrees with Preprocedure content              Oswald Boast, DO   6/28/2021

## 2021-06-28 NOTE — BRIEF OP NOTE
Brief Postoperative Note      Patient: Jesus Ham  YOB: 1975  MRN: [id number]    Date of Procedure: 6/28/2021    Pre-Op Diagnosis: Arthrofibrosis of knee joint, right [M24.661]    Post-Op Diagnosis: Same       Procedure(s):  RIGHT KNEE ARTHROSCOPY, TOTAL SYNOVECTOMY AND MANIPULATION , arthrofibrosis excision    Surgeon(s):  Berlinda Cooks, MD Vivi Kirk, MD    Assistant:  Surgical Assistant: Michael Leo    Anesthesia: General    Estimated Blood Loss (mL): Minimal    Complications: None    Specimens:   * No specimens in log *    Implants:  * No implants in log *      Drains: * No LDAs found *    Findings: refer to formal operative report     Electronically signed by Berlinda Cooks, MD on 6/28/2021 at 11:26 AM

## 2021-06-28 NOTE — ANESTHESIA PROCEDURE NOTES
femoral artery. 25 cc total volume injected. Negative aspiration for heme prior to injection and at several points during injection. Procedure tolerated well. No apparent complications.           Medications Administered  Bupivacaine liposome (EXPAREL) injection 1.3%, 10 mL  bupivacaine (MARCAINE) PF injection 0.5%, 15 mL  Reason for block: post-op pain management and at surgeon's request

## 2021-06-28 NOTE — PROGRESS NOTES
Ambulatory Surgery/Procedure Discharge Note    Vitals:    06/28/21 1314   BP: 112/73   Pulse: 71   Resp: 12   Temp: 97 °F (36.1 °C)   SpO2: 98%     Patient arrived to Phase II recovery alert and oriented. VSS. Reports 3 out of 10 pain which was treated in PACU. Family at bedside. Discharge instructions reviewed with patient and family. Both verbalized understanding. In: 3572 [P.O.:200; I.V.:1113]  Out: 270 [Urine:250]    Restroom use offered before discharge. Yes. Patient voided in restroom. Pain assessment:  present - adequately treated  Pain Level: 3        Patient discharged to home/self care. Patient discharged via wheel chair home with family.       6/28/2021 2:36 PM

## 2021-06-29 ENCOUNTER — HOSPITAL ENCOUNTER (OUTPATIENT)
Dept: PHYSICAL THERAPY | Age: 46
Setting detail: THERAPIES SERIES
Discharge: HOME | End: 2021-06-29
Payer: COMMERCIAL

## 2021-06-29 DIAGNOSIS — M24.661 FIBROSIS OF RIGHT KNEE JOINT: Primary | ICD-10-CM

## 2021-06-29 PROCEDURE — 97530 THERAPEUTIC ACTIVITIES: CPT | Performed by: PHYSICAL THERAPIST

## 2021-06-29 PROCEDURE — 97016 VASOPNEUMATIC DEVICE THERAPY: CPT | Performed by: PHYSICAL THERAPIST

## 2021-06-29 PROCEDURE — 97110 THERAPEUTIC EXERCISES: CPT | Performed by: PHYSICAL THERAPIST

## 2021-06-29 PROCEDURE — 97140 MANUAL THERAPY 1/> REGIONS: CPT | Performed by: PHYSICAL THERAPIST

## 2021-06-29 RX ORDER — DICLOFENAC SODIUM 75 MG/1
50 TABLET, DELAYED RELEASE ORAL 2 TIMES DAILY WITH MEALS
Qty: 40 TABLET | Refills: 0 | Status: SHIPPED | OUTPATIENT
Start: 2021-06-29

## 2021-06-29 NOTE — FLOWSHEET NOTE
[facility] [address] [place] [address] [facility]    Physical Therapy Daily Treatment Note  Date:  2021    Patient Name:  Lopez Jenkins    :  1975  MRN: [id number]  Restrictions/Precautions:    Medical/Treatment Diagnosis Information:  · Diagnosis: M24.661 (ICD-10-CM) - Fibrosis of right knee joint  · Treatment Diagnosis: M25.561 R Knee Pain; M25.661 R Knee Stiffness; R53.1 Weakness; M25.461 R Knee Effusion  · S/P Right Knee Diagnostic Scope  · DOS: 21  · Meds: MOtrin; ASA  ·   · S/P R knee Ascope and Medial/ Lateral Releases  · DOS: 21  · Current PO meds: Oxycodone (not needed yet); ASA 1 tab BID; Zofran PRN    ·   Insurance/Certification information:  PT Insurance Information: PT BENEFITS  FACILITY/ AET[employer]/ EFFECTIVE 2021/ ACTIVE/ DED 0/ COPAY 50/ PAYS 100%/ OOP 5000 / 60 VPCY COMB/ 0 AUTH/ ALL CODES BILLABLE/ TELEHEALTH NOT COVERED/ PRESERVICE/ REF# [id number]/ 5- PAG  Physician Information:  Referring Practitioner: Dr. Yesy Cunha  Has the plan of care been signed (Y/N):        []  Yes  [x]  No     Date of Patient follow up with Physician: 3, 6, 12 weeks PO  Patient seen in consultation with Dr. Yesy Cunha who established the initial/subsequent treatment protocol.   21: Chronic reaction around tibial screw; watching cultures for infection for 3 weeks; okay to progress rehab; returning in 4-6 weeks for patellar med/lat releases pending results of culture; starting patient on 7-day antibiotic as precaution  21: surgery went well; good patellar mobility, although significant scar tissue under the patellar tendon (anterior interval ); 0-135 deg; watch swelling over the first 1-2 weeks-> emphasize ICE; ok to d/c the knee immobilizer; consider FU in 6 weeks; emphasize rolling stool exercise for 125 deg of flexion; consider Voltaren 50 mg BID; watch for stiffness by 4 weeks PO; consider Telehealth visit for future      Is this a Progress Report: []  Yes  [x]  No        If Yes:  Date Range for reporting period:  Beginnin21  Ending:    Progress report will be due (10 Rx or 30 days whichever is less):        Recertification will be due (POC Duration  / 90 days whichever is less): 21         Visit # Insurance Allowable Auth Required   2 60 VPCY []  Yes []  No      OUTCOME MEASURE DATE DEFICIT   LEFS 21 pre-op (no PT appointment but dropped off paperwork) 29% Deficit   LEFS 21 PO 76% Deficit   LEFS Score 21 PO 82% Deficit (= 18%)                  Patient given satisfaction survey? []? Yes                 []? No        Latex Allergy:  []NO      [x]YES! Preferred Language for Healthcare:   [x]English       []other:    Pain level:  -5 /10     SUBJECTIVE:  Presents for initial PO PT appointment using bilateral axillary crutches, PO bandage and dressing intact, using a knee immobilizer. Denies vomiting/ elevated temperature/ calf pain; however with mild nausea. Lives outside of [place]; planning to stay in [place] for ~1-2 weeks as needed.      OBJECTIVE:      21  ROM PROM AROM Overpressure Comment    L R L R L R    Flexion 146 90        Extension +8 -5                              21  Strength L R Comment   Quad 5/5 2+/5 QS   Hamstring      Gastroc      Hip  flexion      Hip abd                    21   Special Test Results/Comment   Homans Neg   Temp Neg     21  Girth L R   Mid Patella 33.0 34.9   Suprapatellar 33.4 35.9   5cm above 40.0 38.5   15cm above       Reflexes/Sensation:    []Dermatomes/Myotomes intact    []Reflexes equal and normal bilaterally   [x]Other: residual numbness on anterior tibia from nerve block     Joint mobility:    [x]Normal    []Hypo   []Hyper    Palpation: calf soft and non-tender    Functional Mobility/Transfers: ind    Posture: flexed knee on right    Bandages/Dressings/Incisions: PO bandage and dressing changed; Steri strips intact; dry/ clean/ (-) redness    Gait: Written and verbal guidelines provided for but not limited to: DME/ HEP/ ICE/ gait/ general medical instructions. HEP:  Patient instructed on HEP on this date with handout provided and all questions answered. Discussions about how to progress sets/reps/resistance as necessary for fatigue and challenge. Patient was instructed to contact PT with any questions or concerns about HEP moving forward. Patient verbally stated she/he understood. HEP instruction:  Patient instructed on HEP on this date with handout provided and all questions answered. Discussions about how to progress sets/reps/resistance as necessary for fatigue and challenge. Patient was instructed to contact PT with any questions or concerns about HEP moving forward. Patient verbally stated she/he understood. Access Code: [email or web address]  URL: [email or web address]  Date: 05/11/2021  Prepared by: Tyesha Clark     Exercises  Long Sitting Ankle Pumps - 5 x daily - 7 x weekly - 50 reps - 1 sets  Seated Table Hamstring Stretch - 3 x daily - 7 x weekly - 5 sets - 30\" hold  Seated Calf Towel Stretch - 3 x daily - 7 x weekly - 5 sets - 30\" hold  Long Sitting Quad Set - 10 x daily - 7 x weekly - 10 reps - 1 sets - 10\" hold  Long Sitting Isometric Hip Adduction and Extension with Ball at Knees - 3 x daily - 7 x weekly - 10 reps - 1 sets - 10\" hold  Supine Active Straight Leg Raise - 1-3 x daily - 7 x weekly - 10 reps - 3 sets  Seated Knee Flexion AAROM - 3 x daily - 7 x weekly - 5-10 reps - 30-60\" hold  Supine Knee Extension Mobilization with Weight - 6 x daily - 7 x weekly - 1 sets - 10 min hold      Therapeutic Exercise and NMR EXR  [x] (09412) Provided verbal/tactile cueing for activities related to strengthening, flexibility, endurance, ROM for improvements in LE, proximal hip, and core control with self care, mobility, lifting, ambulation.   [x] (04024) Provided verbal/tactile cueing for activities related to improving balance, coordination, kinesthetic EVAL (LOW) 40134 (typically 20 minutes face-to-face)  [] EVAL (MOD) 84394 (typically 30 minutes face-to-face)  [] EVAL (HIGH) 01120 (typically 45 minutes face-to-face)  [] RE-EVAL   [x] DW(29999) x 1    [] IONTO  [] NMR (52517) x      [x] VASO  [x] Manual (50815) x  1    [] Other:  [x] TA x 1       [] Mech Traction (69757)  [] ES(attended) (93330)      [] ES (un) (92023):       GOALS:    Patient stated goal: Would like to regain full ROM   [] Progressing: [] Met: [] Not Met: [] Adjusted    Therapist goals for Patient:   Short Term Goals: To be achieved in: 2 weeks  1. Independent in HEP and progression per patient tolerance, in order to prevent re-injury. [] Progressing: [] Met: [] Not Met: [] Adjusted   2. Patient will have a decrease in pain to facilitate improvement in movement, function, and ADLs as indicated by Functional Deficits. [] Progressing: [] Met: [] Not Met: [] Adjusted    Long Term Goals: To be achieved in: 12 weeks PO  1. Disability index score of 25% or less for the LEFS to assist with reaching prior level of function. [] Progressing: [] Met: [] Not Met: [] Adjusted  2. Patient will demonstrate increased AROM to 0-135 or greater to allow for proper joint functioning as indicated by patients Functional Deficits. [] Progressing: [] Met: [] Not Met: [] Adjusted  3. Patient will demonstrate an increase in Strength to 4+/5 or greater in BLE to allow for proper functional mobility as indicated by patients Functional Deficits. [] Progressing: [] Met: [] Not Met: [] Adjusted  4. Patient will return to ADL and other functional activities without increased symptoms or restriction. [] Progressing: [] Met: [] Not Met: [] Adjusted  5.  Patient will pass functional testing at 12 weeks PO in order to discharge to a fully independent HEP. (patient specific functional goal)    [] Progressing: [] Met: [] Not Met: [] Adjusted    Overall Progression Towards Functional goals/ Treatment Progress

## 2021-06-29 NOTE — OP NOTE
[facility]               [address]                                OPERATIVE REPORT    PATIENT NAME: Real Lees                     :        1975  MED REC NO:   [id number]                          ROOM:  ACCOUNT NO:   [de-identified]                           ADMIT DATE: 2021  PROVIDER:     Cecilia Mcgraw MD    DATE OF PROCEDURE:  2021    PREOPERATIVE DIAGNOSIS:  Arthrofibrosis, right knee, status post ACL  reconstruction with scar tissue extending into the anterior interval,  medial lateral retinaculum, and suprapatellar pouch. POSTOPERATIVE DIAGNOSIS:   Arthrofibrosis, right knee, status post ACL  reconstruction with scar tissue extending into the anterior interval,  medial lateral retinaculum, and suprapatellar pouch. OPERATIVE PROCEDURES:  1. Arthroscopic total synovectomy multiple compartments, suprapatellar  mediolateral retinaculum and anterior interval, right knee. 2.  Manipulation of the right knee. SURGEON:  Cecilia Mcgraw MD    FIRST ASSISTANT:  Kathrine Jacobs MD.    ANESTHESIA:  General plus adductor block. OPERATIVE INDICATIONS:  This patient understood the associated risks,  benefits and consented to the operative procedure, having undergone a  prior ligamentous reconstruction, out of town and appearing in our  office with moderate arthrofibrosis. She was started on a home  overpressure program, which she understood and has made good improvement  at home over what she received in the clinic. She had a prior I and D  of a tibial interference screw that was disintegrating and was excised  with cultures negative. OPERATIVE FINDINGS:  1. Gentle manipulation was performed after the synovectomy and release  of the scar tissue, which did obtain the range of motion from initially  3 to 105 degrees range to 3 to 145 degrees.   This was done very gently  and it was possible to really obtain full flexion and due to the fact  that the scar tissue was removed, she should be able to maintain this. 2.  Extensive scar tissue particularly on the anterior intervals. Minimal mediolateral retinacular release was required, which is  beneficial to her. The adhesions throughout the knee were very nicely  released. 3.  Mild two-way articular cartilage abnormalities were present over the  medial femoral condyle and lateral femoral condyle in an area  approximately 15 x 20 mm. These were entirely left alone. Overall, the  procedure went well and she should be able to maintain the motion. 4.  Moderate scar tissue was present within the notch region. This was  completely cleaned out and will allow her to even obtain the final 2 to  3 degrees of extension. Estimated blood loss 25 cc    OPERATIVE PROCEDURE:  This patient was placed in supine position upon  the operating room table and after satisfactory level of general  anesthesia, the right knee was prepped and draped to sterile surgical  field after identification of the signed limb and the timeout procedure. The pivot shift and Lachman tests were completely negative. Again, the  range of motion was 3 to approximately 100 to 110 degrees at this point. The initial procedure consisted of resection of the anterior interval.   The scope was placed in the superolateral portal and the marked scar  tissue in the anterior interval was identified. [facility] scissor was  placed underneath the patellar tendon to protect it. The resection of  the anterior interval scar tissue commenced without difficulty. Fat pad  tissue was retained between the patellar tendon and tibial tubercle, so  as to not have any postoperative adhesions. The anterior horns of the  meniscus were protected.     Next, the scope was placed in the anterolateral portal and the shaver  was brought in through the superolateral portal, and a resection of scar  tissue in the suprapatellar pouch was performed. The scar tissue was  moderate in proportion but was not severe. This nicely freed up and  restored the suprapatellar pouch. Next, the shaver and  electrocoagulation was brought in through the anteromedial portal and  all anteromedial retinacular scar tissue was removed. Finally, the  scope was brought in through the anteromedial portal and the shaver and  as well the electrocoagulator was brought in through the anterolateral  portal to remove all scar tissues and synovial tissue and the lateral  retinacular tissue. Finally, the confirmation with the scope in the  anterolateral portal, there was normal mediolateral patellar glide. At  this point, a very gentle manipulation and the knee was very nicely  brought back to 145 degrees. I previously mentioned the notch was  completely cleared out. The ACL was identified to be intact and  providing excellent stability. The portals particularly the distal  portals were closed with interrupted 3-0 and 4-0 Monocryl in a cosmetic  manner. A double Ace cotton compression dressing was applied. There  were no complications. The patient tolerated the procedure well.         Stephen Mcneill MD    D: 06/28/2021 12:02:33       T: 06/28/2021 14:58:47     [email or web address]  Job#: [id number]     Doc#: [id number]

## 2021-06-30 ENCOUNTER — HOSPITAL ENCOUNTER (OUTPATIENT)
Dept: PHYSICAL THERAPY | Age: 46
Setting detail: THERAPIES SERIES
Discharge: HOME | End: 2021-06-30
Payer: COMMERCIAL

## 2021-06-30 PROCEDURE — 97112 NEUROMUSCULAR REEDUCATION: CPT | Performed by: PHYSICAL THERAPIST

## 2021-06-30 PROCEDURE — 97530 THERAPEUTIC ACTIVITIES: CPT | Performed by: PHYSICAL THERAPIST

## 2021-06-30 PROCEDURE — 97110 THERAPEUTIC EXERCISES: CPT | Performed by: PHYSICAL THERAPIST

## 2021-06-30 PROCEDURE — G0283 ELEC STIM OTHER THAN WOUND: HCPCS | Performed by: PHYSICAL THERAPIST

## 2021-06-30 PROCEDURE — 97016 VASOPNEUMATIC DEVICE THERAPY: CPT | Performed by: PHYSICAL THERAPIST

## 2021-06-30 NOTE — FLOWSHEET NOTE
calf soft and non-tender    Functional Mobility/Transfers: ind    Posture: flexed knee on right    Bandages/Dressings/Incisions: PO bandage and dressing changed; Steri strips intact; dry/ clean/ (-) redness    Gait: Bilateral axillary crutches ~25- 50% PWBAT; ; stiff, antalgic gait    Orthopedic Special Tests:     RESTRICTIONS/PRECAUTIONS: Right Knee Arthrofibrosis     Exercises/Interventions:  Exercise/Equipment Resistance/Repetitions Other comments 6/30/2021   Stretching        Hamstring 5x30\" Prop; 7.5# x   Hip Flexor      ITB      Figure 4      Quad      Inclined Calf      Towel Pull 5x30\" Prop; 7.5# x            ROM        EOB Self-Assist      Sheet Pull 10x10\"     Wall Slide      Manual 5' Manual flexion x   Biodex Passive      Recumbent Bike      ERMI 10' Flexion x   Hang Weights 10' Propped; 10# x   Ankle Pumps x50 hourly  x            Patellar Glides        Medial  3'   x   Superior 3'   x   Inferior 3'   x            Isometrics        Quad sets [id number]\"  15' A/S; 7.5#  NMES x  x   Add sets 10x10\"   x            SLR        Supine 3x10  x   Prone      Abduction 3x10  x   Adducton      SLR+                 Glutes        Bridges      Supine Clams      S/L Clams      Side Stepping        Monster walks                 CKC        Weight Shift      Calf raises 3x 10 %WS; 50% x   Wall sits      Step ups        Squatting      CC TKE      SL DL                 PRE        Extension  RANGE: 90-30    Flexion  RANGE: Avail    Leg Press   RANGE: 80-10    Cable Column                 Balance        Tandem Stance      Tilt board        SLS       Biodex balance                 Other        Treadmill        Gait Training            Manual Interventions            Patient have access to gym? [] Yes  [] No  Patient have equipment at home?  [] Yes  [] No     Patient Education:  5/13/21: Educated patient on all post-op instructions including but not limited to R.I.C.E., post-op HEP, DVT prevention, warning signs of infection and DVT, and on activity limitations; instructed patient on KEXT OP Program: 60 minutes per day of 10-20# weight hangs; discussed PT back in [place] until returning to [place]; discussed wound care/dressing changes  6/29/21: Full preop instructions provided. Written and verbal guidelines provided for but not limited to: DME/ HEP/ ICE/ gait/ general medical instructions. HEP:  Patient instructed on HEP on this date with handout provided and all questions answered. Discussions about how to progress sets/reps/resistance as necessary for fatigue and challenge. Patient was instructed to contact PT with any questions or concerns about HEP moving forward. Patient verbally stated she/he understood. HEP instruction:  Patient instructed on HEP on this date with handout provided and all questions answered. Discussions about how to progress sets/reps/resistance as necessary for fatigue and challenge. Patient was instructed to contact PT with any questions or concerns about HEP moving forward. Patient verbally stated she/he understood. Access Code: [email or web address]  URL: [employer]/  Date: 05/11/2021  Prepared by:  Amirah Curiel     Exercises  Long Sitting Ankle Pumps - 5 x daily - 7 x weekly - 50 reps - 1 sets  Seated Table Hamstring Stretch - 3 x daily - 7 x weekly - 5 sets - 30\" hold  Seated Calf Towel Stretch - 3 x daily - 7 x weekly - 5 sets - 30\" hold  Long Sitting Quad Set - 10 x daily - 7 x weekly - 10 reps - 1 sets - 10\" hold  Long Sitting Isometric Hip Adduction and Extension with Ball at Knees - 3 x daily - 7 x weekly - 10 reps - 1 sets - 10\" hold  Supine Active Straight Leg Raise - 1-3 x daily - 7 x weekly - 10 reps - 3 sets  Seated Knee Flexion AAROM - 3 x daily - 7 x weekly - 5-10 reps - 30-60\" hold  Supine Knee Extension Mobilization with Weight - 6 x daily - 7 x weekly - 1 sets - 10 min hold      Therapeutic Exercise and NMR EXR  [x] (27029) Provided verbal/tactile cueing for activities related to strengthening, flexibility, endurance, ROM for improvements in LE, proximal hip, and core control with self care, mobility, lifting, ambulation. [x] (94892) Provided verbal/tactile cueing for activities related to improving balance, coordination, kinesthetic sense, posture, motor skill, proprioception  to assist with LE, proximal hip, and core control in self care, mobility, lifting, ambulation and eccentric single leg control. NMR and Therapeutic Activities:    [x] (67132 or 74323) Provided verbal/tactile cueing for activities related to improving balance, coordination, kinesthetic sense, posture, motor skill, proprioception and motor activation to allow for proper function of core, proximal hip and LE with self care and ADLs  [x] (71444) Gait Re-education- Provided training and instruction to the patient for proper LE, core and proximal hip recruitment and positioning and eccentric body weight control with ambulation re-education including up and down stairs     Home Exercise Program:    [x] (91417) Reviewed/Progressed HEP activities related to strengthening, flexibility, endurance, ROM of core, proximal hip and LE for functional self-care, mobility, lifting and ambulation/stair navigation   [x] (31251)Reviewed/Progressed HEP activities related to improving balance, coordination, kinesthetic sense, posture, motor skill, proprioception of core, proximal hip and LE for self care, mobility, lifting, and ambulation/stair navigation      Manual Treatments:  PROM / STM / Oscillations-Mobs:  G-I, II, III, IV (PA's, Inf., Post.)  [x] (22905) Provided manual therapy to mobilize LE, proximal hip and/or LS spine soft tissue/joints for the purpose of modulating pain, promoting relaxation,  increasing ROM, reducing/eliminating soft tissue swelling/inflammation/restriction, improving soft tissue extensibility and allowing for proper ROM for normal function with self care, mobility, lifting and ambulation.      Modalities: [] hot packs   [x] EMS   [] Ultrasound  [] ice     [x] vasopneumatic  [] high volt/EGS  [] phono   [] tens    [] ionto  [] autorange/biodex  [] Interferential  [] other      Charges:  Timed Code Treatment Minutes: 61'   Total Treatment Minutes: 80'       [] EVAL (LOW) 455 1011 (typically 20 minutes face-to-face)  [] EVAL (MOD) 92051 (typically 30 minutes face-to-face)  [] EVAL (HIGH) 14951 (typically 45 minutes face-to-face)  [] RE-EVAL   [x] IW(60095) x 2    [] IONTO  [] NMR (24240) x      [x] VASO  [x] Manual (88990) x  1    [] Other:  [x] TA x 1       [] Mech Traction (12793)  [] ES(attended) (09297)      [x] ES (un) (25544):       GOALS:    Patient stated goal: Would like to regain full ROM   [] Progressing: [] Met: [] Not Met: [] Adjusted    Therapist goals for Patient:   Short Term Goals: To be achieved in: 2 weeks  1. Independent in HEP and progression per patient tolerance, in order to prevent re-injury. [] Progressing: [] Met: [] Not Met: [] Adjusted   2. Patient will have a decrease in pain to facilitate improvement in movement, function, and ADLs as indicated by Functional Deficits. [] Progressing: [] Met: [] Not Met: [] Adjusted    Long Term Goals: To be achieved in: 12 weeks PO  1. Disability index score of 25% or less for the LEFS to assist with reaching prior level of function. [] Progressing: [] Met: [] Not Met: [] Adjusted  2. Patient will demonstrate increased AROM to 0-135 or greater to allow for proper joint functioning as indicated by patients Functional Deficits. [] Progressing: [] Met: [] Not Met: [] Adjusted  3. Patient will demonstrate an increase in Strength to 4+/5 or greater in BLE to allow for proper functional mobility as indicated by patients Functional Deficits. [] Progressing: [] Met: [] Not Met: [] Adjusted  4. Patient will return to ADL and other functional activities without increased symptoms or restriction. [] Progressing: [] Met: [] Not Met: [] Adjusted  5. Patient will pass functional testing at 12 weeks PO in order to discharge to a fully independent [facility]. (patient specific functional goal)    [] Progressing: [] Met: [] Not Met: [] Adjusted    Overall Progression Towards Functional goals/ Treatment Progress Update:  [] Patient is progressing as expected towards functional goals listed. [] Progression is slowed due to complexities/Impairments listed. [] Progression has been slowed due to co-morbidities. [x] Plan just implemented, too soon to assess goals progression <30days   [] Goals require adjustment due to lack of progress  [] Patient is not progressing as expected and requires additional follow up with physician  [] Other    Prognosis for POC: [x] Good [] Fair  [] Poor      Patient requires continued skilled intervention: [x] Yes  [] No    Treatment/Activity Tolerance:  [x] Patient able to complete treatment  [] Patient limited by fatigue  [x] Patient limited by pain     [] Patient limited by other medical complications  [x] Other:  Significant functional ADL limitations include, but not limited to knee pain/ swelling/ difficulty walking/ limited ROM/ LE muscle weakness/ LE balance deficits        PLAN:   [] Continue per plan of care [] Alter current plan (see comments above)  [x] Plan of care initiated [] Hold pending MD visit [] Discharge  Frequency/ Duration: Daily x 1 week-> 2-3x week    Electronically signed by:  Parag Nguyen PT    Note: If patient does not return for scheduled/ recommended follow up visits, this note will serve as a discharge from care along with most recent update on progress.

## 2021-07-01 ENCOUNTER — HOSPITAL ENCOUNTER (OUTPATIENT)
Dept: PHYSICAL THERAPY | Age: 46
Setting detail: THERAPIES SERIES
Discharge: HOME | End: 2021-07-01
Payer: COMMERCIAL

## 2021-07-01 PROCEDURE — 97110 THERAPEUTIC EXERCISES: CPT | Performed by: PHYSICAL THERAPIST

## 2021-07-01 PROCEDURE — 97016 VASOPNEUMATIC DEVICE THERAPY: CPT | Performed by: PHYSICAL THERAPIST

## 2021-07-01 PROCEDURE — 97530 THERAPEUTIC ACTIVITIES: CPT | Performed by: PHYSICAL THERAPIST

## 2021-07-01 PROCEDURE — G0283 ELEC STIM OTHER THAN WOUND: HCPCS | Performed by: PHYSICAL THERAPIST

## 2021-07-01 PROCEDURE — 97140 MANUAL THERAPY 1/> REGIONS: CPT | Performed by: PHYSICAL THERAPIST

## 2021-07-01 NOTE — FLOWSHEET NOTE
[facility] [address] [place] [address] [facility]    Physical Therapy Daily Treatment Note  Date:  2021    Patient Name:  Magali Springer    :  1975  MRN: [id number]  Restrictions/Precautions:    Medical/Treatment Diagnosis Information:  · Diagnosis: M24.661 (ICD-10-CM) - Fibrosis of right knee joint  · Treatment Diagnosis: M25.561 R Knee Pain; M25.661 R Knee Stiffness; R53.1 Weakness; M25.461 R Knee Effusion  · S/P Right Knee Diagnostic Scope  · DOS: 21  · Meds: MOtrin; ASA  ·   · S/P R knee Ascope and Medial/ Lateral Releases  · DOS: 21  · Current PO meds: Oxycodone (not needed yet); ASA 1 tab BID; Zofran PRN    ·   Insurance/Certification information:  PT Insurance Information: PT BENEFITS  FACILITY/ [employer]/ EFFECTIVE 2021/ ACTIVE/ DED 0/ COPAY 50/ PAYS 100%/ OOP 5000 / 60 VPCY COMB/ 0 AUTH/ ALL CODES BILLABLE/ TELEHEALTH NOT COVERED/ PRESERVICE/ REF# [id number]/ 5- PAG  Physician Information:  Referring Practitioner: Dr. Una Hernandez  Has the plan of care been signed (Y/N):        []  Yes  [x]  No     Date of Patient follow up with Physician: 3, 6, 12 weeks PO  Patient seen in consultation with Dr. Una Hernandez who established the initial/subsequent treatment protocol.   21: Chronic reaction around tibial screw; watching cultures for infection for 3 weeks; okay to progress rehab; returning in 4-6 weeks for patellar med/lat releases pending results of culture; starting patient on 7-day antibiotic as precaution  21: surgery went well; good patellar mobility, although significant scar tissue under the patellar tendon (anterior interval ); 0-135 deg; watch swelling over the first 1-2 weeks-> emphasize ICE; ok to d/c the knee immobilizer; consider FU in 6 weeks; emphasize rolling stool exercise for 125 deg of flexion; consider Voltaren 50 mg BID; watch for stiffness by 4 weeks PO; consider Telehealth visit for future      Is this a Progress Report:     [] Yes  [x]  No        If Yes:  Date Range for reporting period:  Beginnin21  Ending:    Progress report will be due (10 Rx or 30 days whichever is less):        Recertification will be due (POC Duration  / 90 days whichever is less): 21         Visit # Insurance Allowable Auth Required   [address] []  Yes []  No      OUTCOME MEASURE DATE DEFICIT   LEFS 21 pre-op (no PT appointment but dropped off paperwork) 29% Deficit   LEFS 21 PO 76% Deficit   LEFS Score 21 PO 82% Deficit (= 18%)                  Patient given satisfaction survey? []? Yes                 []? No        Latex Allergy:  []NO      [x]YES! Preferred Language for Healthcare:   [x]English       []other:    Pain level:  -5 /10     SUBJECTIVE:  Presents PWB with bilateral axillary crutches ~50-75%. Noted having trouble getting LE comfortable for sleeping. Initiated Oxycodone today for PT since adductor block is wearing off. Brought home NMES/ TENS unit to review  Presents for initial PO PT appointment using bilateral axillary crutches, PO bandage and dressing intact, using a knee immobilizer. Denies vomiting/ elevated temperature/ calf pain; however with mild nausea. Lives outside of [place]; planning to stay in [place] for ~1-2 weeks as needed.      OBJECTIVE:      21  ROM PROM AROM Overpressure Comment    L R L R L R    Flexion 146 90      125   ERMI   Extension +8 -5                              21  Strength L R Comment   Quad 5/5 2+/5 QS   Hamstring      Gastroc      Hip  flexion      Hip abd                    21   Special Test Results/Comment   Homans Neg   Temp Neg     21  Girth L R   Mid Patella 33.0 34.9   Suprapatellar 33.4 35.9   5cm above 40.0 38.5   15cm above       Reflexes/Sensation:    []Dermatomes/Myotomes intact    []Reflexes equal and normal bilaterally   [x]Other: residual numbness on anterior tibia from nerve block     Joint mobility: [x]Normal    []Hypo   []Hyper    Palpation: calf soft and non-tender    Functional Mobility/Transfers: ind    Posture: flexed knee on right    Bandages/Dressings/Incisions: PO bandage and dressing changed; Steri strips intact; dry/ clean/ (-) redness    Gait: Bilateral axillary crutches ~50-75% PWBAT; ; stiff, antalgic gait    Orthopedic Special Tests:     RESTRICTIONS/PRECAUTIONS: Right Knee Arthrofibrosis     Exercises/Interventions:  Exercise/Equipment Resistance/Repetitions Other comments 7/1/2021   Stretching        Hamstring 5x30\" Prop; 10# x   Hip Flexor      ITB      Figure 4      Quad      Inclined Calf      Towel Pull 5x30\" Prop; 10# x            ROM        EOB Self-Assist      Sheet Pull 10x10\"     Wall Slide      Manual 5' Manual flexion x   Biodex Passive      Recumbent Bike      ERMI 10' Flexion x   Hang Weights 10' Propped; 10# x   Ankle Pumps x50 hourly  x            Patellar Glides        Medial  3'   x   Superior 3'   x   Inferior 3'   x            Isometrics        Quad sets [id number]\"  15' A/S; 10#  NMES x  x   Add sets 10x10\"   x            SLR        Supine 3x10  x   Prone      Abduction 3x10  x   Adducton      SLR+                 Glutes        Bridges      Supine Clams      S/L Clams      Side Stepping        Monster walks                 CKC        Weight Shift      Calf raises 3x 10 %WS; 50% x   Wall sits      Step ups        Squatting      CC TKE      SL DL                 PRE        Extension  RANGE: 90-30    Flexion  RANGE: Avail    Leg Press   RANGE: 80-10    Cable Column                 Balance        Tandem Stance      Tilt board        SLS       Biodex balance                 Other        Treadmill        Gait Training            Manual Interventions            Patient have access to gym? [] Yes  [] No  Patient have equipment at home?  [] Yes  [] No     Patient Education:  5/13/21: Educated patient on all post-op instructions including but not limited to R.I.C.E., post-op HEP, DVT prevention, warning signs of infection and DVT, and on activity limitations; instructed patient on KEXT OP Program: 60 minutes per day of 10-20# weight hangs; discussed PT back in [place] until returning to [place]; discussed wound care/dressing changes  6/29/21: Full preop instructions provided. Written and verbal guidelines provided for but not limited to: DME/ HEP/ ICE/ gait/ general medical instructions. HEP:  Patient instructed on HEP on this date with handout provided and all questions answered. Discussions about how to progress sets/reps/resistance as necessary for fatigue and challenge. Patient was instructed to contact PT with any questions or concerns about HEP moving forward. Patient verbally stated she/he understood. HEP instruction:  Patient instructed on HEP on this date with handout provided and all questions answered. Discussions about how to progress sets/reps/resistance as necessary for fatigue and challenge. Patient was instructed to contact PT with any questions or concerns about HEP moving forward. Patient verbally stated she/he understood. Access Code: [email or web address]  URL: [email or web address]  Date: 05/11/2021  Prepared by:  Tierra Ram     Exercises  Long Sitting Ankle Pumps - 5 x daily - 7 x weekly - 50 reps - 1 sets  Seated Table Hamstring Stretch - 3 x daily - 7 x weekly - 5 sets - 30\" hold  Seated Calf Towel Stretch - 3 x daily - 7 x weekly - 5 sets - 30\" hold  Long Sitting Quad Set - 10 x daily - 7 x weekly - 10 reps - 1 sets - 10\" hold  Long Sitting Isometric Hip Adduction and Extension with Ball at Knees - 3 x daily - 7 x weekly - 10 reps - 1 sets - 10\" hold  Supine Active Straight Leg Raise - 1-3 x daily - 7 x weekly - 10 reps - 3 sets  Seated Knee Flexion AAROM - 3 x daily - 7 x weekly - 5-10 reps - 30-60\" hold  Supine Knee Extension Mobilization with Weight - 6 x daily - 7 x weekly - 1 sets - 10 min hold      Therapeutic Exercise and NMR EXR  [x] (75950) Provided verbal/tactile cueing for activities related to strengthening, flexibility, endurance, ROM for improvements in LE, proximal hip, and core control with self care, mobility, lifting, ambulation. [x] (25126) Provided verbal/tactile cueing for activities related to improving balance, coordination, kinesthetic sense, posture, motor skill, proprioception  to assist with LE, proximal hip, and core control in self care, mobility, lifting, ambulation and eccentric single leg control.      NMR and Therapeutic Activities:    [x] (82289 or 92873) Provided verbal/tactile cueing for activities related to improving balance, coordination, kinesthetic sense, posture, motor skill, proprioception and motor activation to allow for proper function of core, proximal hip and LE with self care and ADLs  [x] (60753) Gait Re-education- Provided training and instruction to the patient for proper LE, core and proximal hip recruitment and positioning and eccentric body weight control with ambulation re-education including up and down stairs     Home Exercise Program:    [x] (51857) Reviewed/Progressed HEP activities related to strengthening, flexibility, endurance, ROM of core, proximal hip and LE for functional self-care, mobility, lifting and ambulation/stair navigation   [x] (66516)Reviewed/Progressed HEP activities related to improving balance, coordination, kinesthetic sense, posture, motor skill, proprioception of core, proximal hip and LE for self care, mobility, lifting, and ambulation/stair navigation      Manual Treatments:  PROM / STM / Oscillations-Mobs:  G-I, II, III, IV (PA's, Inf., Post.)  [x] (92603) Provided manual therapy to mobilize LE, proximal hip and/or LS spine soft tissue/joints for the purpose of modulating pain, promoting relaxation,  increasing ROM, reducing/eliminating soft tissue swelling/inflammation/restriction, improving soft tissue extensibility and allowing for proper ROM for normal function with self care, mobility, lifting and ambulation. Modalities:     [] hot packs   [x] EMS   [] Ultrasound  [] ice     [x] vasopneumatic  [] high volt/EGS  [] phono   [] tens    [] ionto  [] autorange/biodex  [] Interferential  [] other      Charges:  Timed Code Treatment Minutes: 61'   Total Treatment Minutes: 80'       [] EVAL (LOW) 455 1011 (typically 20 minutes face-to-face)  [] EVAL (MOD) 22590 (typically 30 minutes face-to-face)  [] EVAL (HIGH) 20889 (typically 45 minutes face-to-face)  [] RE-EVAL   [x] SV(63935) x 2    [] IONTO  [] NMR (75668) x      [x] VASO  [x] Manual (07266) x  1    [] Other:  [x] TA x 1       [] Mech Traction (47711)  [] ES(attended) (09676)      [x] ES (un) (77945):       GOALS:    Patient stated goal: Would like to regain full ROM   [] Progressing: [] Met: [] Not Met: [] Adjusted    Therapist goals for Patient:   Short Term Goals: To be achieved in: 2 weeks  1. Independent in HEP and progression per patient tolerance, in order to prevent re-injury. [] Progressing: [] Met: [] Not Met: [] Adjusted   2. Patient will have a decrease in pain to facilitate improvement in movement, function, and ADLs as indicated by Functional Deficits. [] Progressing: [] Met: [] Not Met: [] Adjusted    Long Term Goals: To be achieved in: 12 weeks PO  1. Disability index score of 25% or less for the LEFS to assist with reaching prior level of function. [] Progressing: [] Met: [] Not Met: [] Adjusted  2. Patient will demonstrate increased AROM to 0-135 or greater to allow for proper joint functioning as indicated by patients Functional Deficits. [] Progressing: [] Met: [] Not Met: [] Adjusted  3. Patient will demonstrate an increase in Strength to 4+/5 or greater in BLE to allow for proper functional mobility as indicated by patients Functional Deficits. [] Progressing: [] Met: [] Not Met: [] Adjusted  4. Patient will return to ADL and other functional activities without increased symptoms or restriction.    [] Progressing: [] Met: [] Not Met: [] Adjusted  5. Patient will pass functional testing at 12 weeks PO in order to discharge to a fully independent HEP. (patient specific functional goal)    [] Progressing: [] Met: [] Not Met: [] Adjusted    Overall Progression Towards Functional goals/ Treatment Progress Update:  [] Patient is progressing as expected towards functional goals listed. [] Progression is slowed due to complexities/Impairments listed. [] Progression has been slowed due to co-morbidities. [x] Plan just implemented, too soon to assess goals progression <30days   [] Goals require adjustment due to lack of progress  [] Patient is not progressing as expected and requires additional follow up with physician  [] Other    Prognosis for POC: [x] Good [] Fair  [] Poor      Patient requires continued skilled intervention: [x] Yes  [] No    Treatment/Activity Tolerance:  [x] Patient able to complete treatment  [] Patient limited by fatigue  [x] Patient limited by pain     [] Patient limited by other medical complications  [x] Other:  Significant functional ADL limitations include, but not limited to knee pain/ swelling/ difficulty walking/ limited ROM/ LE muscle weakness/ LE balance deficits. OOT packet provided and reviewed. Home ES unit reviewed for NMES/ TENS options. PLAN:   [x] Continue per plan of care [] Alter current plan (see comments above)  [] Plan of care initiated [] Hold pending MD visit [] Discharge  Frequency/ Duration: Daily x 1 week-> 2-3x week    Electronically signed by:  Steven Garsia PT    Note: If patient does not return for scheduled/ recommended follow up visits, this note will serve as a discharge from care along with most recent update on progress.

## 2021-07-02 ENCOUNTER — HOSPITAL ENCOUNTER (OUTPATIENT)
Dept: PHYSICAL THERAPY | Age: 46
Setting detail: THERAPIES SERIES
Discharge: HOME | End: 2021-07-02
Payer: COMMERCIAL

## 2021-07-02 PROCEDURE — 97140 MANUAL THERAPY 1/> REGIONS: CPT | Performed by: PHYSICAL THERAPIST

## 2021-07-02 PROCEDURE — G0283 ELEC STIM OTHER THAN WOUND: HCPCS | Performed by: PHYSICAL THERAPIST

## 2021-07-02 PROCEDURE — 97110 THERAPEUTIC EXERCISES: CPT | Performed by: PHYSICAL THERAPIST

## 2021-07-02 PROCEDURE — 97016 VASOPNEUMATIC DEVICE THERAPY: CPT | Performed by: PHYSICAL THERAPIST

## 2021-07-02 NOTE — FLOWSHEET NOTE
Yes  [x]  No        If Yes:  Date Range for reporting period:  Beginnin21  Ending:    Progress report will be due (10 Rx or 30 days whichever is less):        Recertification will be due (POC Duration  / 90 days whichever is less): 21         Visit # Insurance Allowable Auth Required   [address] []  Yes []  No      OUTCOME MEASURE DATE DEFICIT   LEFS 21 pre-op (no PT appointment but dropped off paperwork) 29% Deficit   LEFS 21 PO 76% Deficit   LEFS Score 21 PO 82% Deficit (= 18%)                  Patient given satisfaction survey? []? Yes                 []? No        Latex Allergy:  []NO      [x]YES! Preferred Language for Healthcare:   [x]English       []other:    Pain level:  1-5 /10     SUBJECTIVE:  Pt states that she is feeling pretty good today, no changes since visit yesterday. Will be returning home to [facility] today.      OBJECTIVE:      21  ROM PROM AROM Overpressure Comment    L R L R L R    Flexion 146 90      131   ERMI   Extension +8 -5                              21  Strength L R Comment   Quad  2+/5 QS   Hamstring      Gastroc      Hip  flexion      Hip abd                    21   Special Test Results/Comment   Homans Neg   Temp Neg     21  Girth L R   Mid Patella 33.0 34.9   Suprapatellar 33.4 35.9   5cm above 40.0 38.5   15cm above       Reflexes/Sensation:    []Dermatomes/Myotomes intact    []Reflexes equal and normal bilaterally   [x]Other: residual numbness on anterior tibia from nerve block     Joint mobility:    [x]Normal    []Hypo   []Hyper    Palpation: calf soft and non-tender    Functional Mobility/Transfers: ind    Posture: flexed knee on right    Bandages/Dressings/Incisions: PO bandage and dressing changed; Steri strips intact; dry/ clean/ (-) redness    Gait: Bilateral axillary crutches ~50-75% PWBAT; ; stiff, antalgic gait    Orthopedic Special Tests:     RESTRICTIONS/PRECAUTIONS: Right Knee Arthrofibrosis Exercises/Interventions:  Exercise/Equipment Resistance/Repetitions Other comments 7/2/2021   Stretching        Hamstring 5x30\" Prop; 10# x   Hip Flexor      ITB      Figure 4      Quad      Inclined Calf      Towel Pull 5x30\" Prop; 10# x            ROM        EOB Self-Assist      Sheet Pull 10x10\"     Wall Slide      Manual 5' Manual flexion    Biodex Passive      Recumbent Bike      ERMI 10' Flexion x   Hang Weights 10' Propped; 10# x   Ankle Pumps x50 hourly              Patellar Glides        Medial 3'   x   Superior 3'   x   Inferior 3'   x            Isometrics        Quad sets [id number]\"   A/S; 10# NMES x   Add sets 10x10\"   x            SLR        Supine 3x10 NMES x   Prone      Abduction 3x10 NMES x   Adducton      SLR+                 Glutes        Bridges      Supine Clams      S/L Clams      Side Stepping        Monster walks                 CKC        Weight Shift      Calf raises 3x 10 %WS; 50% x   Wall sits      Step ups        Squatting      CC TKE      SL DL                 PRE        Extension  RANGE: 90-30    Flexion  RANGE: Avail    Leg Press   RANGE: 80-10    Cable Column                 Balance        Tandem Stance      Tilt board        SLS       Biodex balance                 Other        Treadmill        Gait Training            Manual Interventions            Patient have access to gym? [] Yes  [] No  Patient have equipment at home? [] Yes  [] No     Patient Education:  5/13/21: Educated patient on all post-op instructions including but not limited to R.I.C.E., post-op HEP, DVT prevention, warning signs of infection and DVT, and on activity limitations; instructed patient on KEXT OP Program: 60 minutes per day of 10-20# weight hangs; discussed PT back in [place] until returning to [place]; discussed wound care/dressing changes  6/29/21: Full preop instructions provided.  Written and verbal guidelines provided for but not limited to: DME/ HEP/ ICE/ gait/ general medical instructions. HEP:  Patient instructed on HEP on this date with handout provided and all questions answered. Discussions about how to progress sets/reps/resistance as necessary for fatigue and challenge. Patient was instructed to contact PT with any questions or concerns about HEP moving forward. Patient verbally stated she/he understood. HEP instruction:  Patient instructed on HEP on this date with handout provided and all questions answered. Discussions about how to progress sets/reps/resistance as necessary for fatigue and challenge. Patient was instructed to contact PT with any questions or concerns about HEP moving forward. Patient verbally stated she/he understood. Access Code: [email or web address]  URL: [email or web address]  Date: 05/11/2021  Prepared by: [place] Span     Exercises  Long Sitting Ankle Pumps - 5 x daily - 7 x weekly - 50 reps - 1 sets  Seated Table Hamstring Stretch - 3 x daily - 7 x weekly - 5 sets - 30\" hold  Seated Calf Towel Stretch - 3 x daily - 7 x weekly - 5 sets - 30\" hold  Long Sitting Quad Set - 10 x daily - 7 x weekly - 10 reps - 1 sets - 10\" hold  Long Sitting Isometric Hip Adduction and Extension with Ball at Knees - 3 x daily - 7 x weekly - 10 reps - 1 sets - 10\" hold  Supine Active Straight Leg Raise - 1-3 x daily - 7 x weekly - 10 reps - 3 sets  Seated Knee Flexion AAROM - 3 x daily - 7 x weekly - 5-10 reps - 30-60\" hold  Supine Knee Extension Mobilization with Weight - 6 x daily - 7 x weekly - 1 sets - 10 min hold      Therapeutic Exercise and NMR EXR  [x] (37578) Provided verbal/tactile cueing for activities related to strengthening, flexibility, endurance, ROM for improvements in LE, proximal hip, and core control with self care, mobility, lifting, ambulation.   [x] (57274) Provided verbal/tactile cueing for activities related to improving balance, coordination, kinesthetic sense, posture, motor skill, proprioception  to assist with LE, proximal hip, and core control in self care, mobility, lifting, ambulation and eccentric single leg control. NMR and Therapeutic Activities:    [x] (03023 or 82942) Provided verbal/tactile cueing for activities related to improving balance, coordination, kinesthetic sense, posture, motor skill, proprioception and motor activation to allow for proper function of core, proximal hip and LE with self care and ADLs  [x] (34669) Gait Re-education- Provided training and instruction to the patient for proper LE, core and proximal hip recruitment and positioning and eccentric body weight control with ambulation re-education including up and down stairs     Home Exercise Program:    [x] (06677) Reviewed/Progressed HEP activities related to strengthening, flexibility, endurance, ROM of core, proximal hip and LE for functional self-care, mobility, lifting and ambulation/stair navigation   [x] (83666)Reviewed/Progressed HEP activities related to improving balance, coordination, kinesthetic sense, posture, motor skill, proprioception of core, proximal hip and LE for self care, mobility, lifting, and ambulation/stair navigation      Manual Treatments:  PROM / STM / Oscillations-Mobs:  G-I, II, III, IV (PA's, Inf., Post.)  [x] (08085) Provided manual therapy to mobilize LE, proximal hip and/or LS spine soft tissue/joints for the purpose of modulating pain, promoting relaxation,  increasing ROM, reducing/eliminating soft tissue swelling/inflammation/restriction, improving soft tissue extensibility and allowing for proper ROM for normal function with self care, mobility, lifting and ambulation.      Modalities:     [] hot packs   [x] EMS   [] Ultrasound  [] ice     [x] vasopneumatic  [] high volt/EGS  [] phono   [] tens    [] ionto  [] autorange/biodex  [] Interferential  [] other      Charges:  Timed Code Treatment Minutes: 61'   Total Treatment Minutes: 80'       [] EVAL (LOW) 455 1011 (typically 20 minutes face-to-face)  [] EVAL (MOD) 03192 (typically 30 minutes due to complexities/Impairments listed. [] Progression has been slowed due to co-morbidities. [x] Plan just implemented, too soon to assess goals progression <30days   [] Goals require adjustment due to lack of progress  [] Patient is not progressing as expected and requires additional follow up with physician  [] Other    Prognosis for POC: [x] Good [] Fair  [] Poor      Patient requires continued skilled intervention: [x] Yes  [] No    Treatment/Activity Tolerance:  [x] Patient able to complete treatment  [] Patient limited by fatigue  [x] Patient limited by pain     [] Patient limited by other medical complications  [x] Other:  Pt exhibits good mobility of patella. She exhibits fair + quad set. Pt tolerated session well and without pain. Pt achieved 125 knee flex on ERMI which is 6 deg improvement from yesterday's session. She is progressing well compared to pre-op function and status. Pt will be returning home to [place] today and will continue PT at a clinic there. She will f/u in [place] in about in about 6 weeks. PLAN:   [x] Continue per plan of care [] Alter current plan (see comments above)  [] Plan of care initiated [] Hold pending MD visit [] Discharge  Frequency/ Duration: Daily x 1 week-> 2-3x week    Electronically signed by:  Ashely Jesus, PT    Note: If patient does not return for scheduled/ recommended follow up visits, this note will serve as a discharge from care along with most recent update on progress.

## 2021-09-23 ENCOUNTER — OFFICE VISIT (OUTPATIENT)
Dept: ORTHOPEDIC SURGERY | Age: 46
End: 2021-09-23

## 2021-09-23 VITALS — BODY MASS INDEX: 21.79 KG/M2 | HEIGHT: 60 IN | WEIGHT: 111 LBS

## 2021-09-23 DIAGNOSIS — M24.661 FIBROSIS OF RIGHT KNEE JOINT: Primary | ICD-10-CM

## 2021-09-23 DIAGNOSIS — M25.561 RIGHT KNEE PAIN, UNSPECIFIED CHRONICITY: ICD-10-CM

## 2021-09-23 PROCEDURE — 99024 POSTOP FOLLOW-UP VISIT: CPT | Performed by: ORTHOPAEDIC SURGERY

## 2021-09-23 NOTE — PROGRESS NOTES
Chief Complaint    Follow-up (Right knee)      History of Present Illness:  Becca Lerner is a 42 y.o. female who presents for follow up of right knee(s). The patient is here for 3 months follow up from their right Total Synovectomy with Manipulation on 2021. The patient is doing well over all. They report their range of motion to be 0 to 135 with some difficulty. They are working closely with physical therapy on the post operative rehabilitation protocol as well as a home exercise program. Pt reports continued issues with patella pain with extension and general overall stiffness in her right knee after standing for extended periods of time. She continues to work with physical therapy closer to home. 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 She feels she is not ready to do this as she has pain with just walking.     Pain Assessment:  Location: right  Level: 4 out of 10  Duration: Weeks  Description: sharp  Result of an injury: Yes  Work related injury: No  Aggravating actions: walking  Relieving Factors: rest  Wants injections: No     Past Medical History:   Diagnosis Date    Arthritis     History of UTI     Osteoarthritis     PONV (postoperative nausea and vomiting)         Past Surgical History:   Procedure Laterality Date    ABDOMEN SURGERY      total 6 lap surgeries    BREAST ENHANCEMENT SURGERY       SECTION      x2    ENDOMETRIAL ABLATION      KNEE SURGERY Right     acl tissue repair     KNEE SURGERY Right 2021    RIGHT KNEE INCISION AND DRAINAGE OF TIBIAL TUNNEL performed by Bibi Solomon [facility] at [facility] 1 Right     hardware removed    KNEE SURGERY Right 2021    RIGHT KNEE ARTHROSCOPY, TOTAL SYNOVECTOMY AND MANIPULATION performed by Bibi Solomon MD at [address]      lymph node removal    OVARIAN CYST REMOVAL         Family History   Problem Relation Age of Onset    Cancer Mother  Heart Disease Mother     High Blood Pressure Father        Social History     Socioeconomic History    Marital status:      Spouse name: None    Number of children: None    Years of education: None    Highest education level: None   Occupational History    None   Tobacco Use    Smoking status: Never Smoker    Smokeless tobacco: Never Used   Vaping Use    Vaping Use: Never used   Substance and Sexual Activity    Alcohol use: Yes     Comment: RARE    Drug use: Never    Sexual activity: None   Other Topics Concern    None   Social History Narrative    None     Social Determinants of Health     Financial Resource Strain:     Difficulty of Paying Living Expenses:    Food Insecurity:     Worried About Running Out of Food in the Last Year:     Ran Out of Food in the Last Year:    Transportation Needs:     Lack of Transportation (Medical):      Lack of Transportation (Non-Medical):    Physical Activity:     Days of Exercise per Week:     Minutes of Exercise per Session:    Stress:     Feeling of Stress :    Social Connections:     Frequency of Communication with Friends and Family:     Frequency of Social Gatherings with Friends and Family:     Attends [employer] Services:     Active Member of Clubs or Organizations:     Attends Club or Organization Meetings:     Marital Status:    Intimate Partner Violence:     Fear of Current or Ex-Partner:     Emotionally Abused:     Physically Abused:     Sexually Abused:        Current Outpatient Medications   Medication Sig Dispense Refill    diclofenac (VOLTAREN) 75 MG EC tablet Take 1 tablet by mouth 2 times daily (with meals) 40 tablet 0    senna-docusate (PERICOLACE) 8.6-50 MG per tablet Take 2 tablets by mouth daily as needed for Constipation 30 tablet 0    ondansetron (ZOFRAN) 4 MG tablet Take 1 tablet by mouth 3 times daily as needed for Nausea or Vomiting 30 tablet 0    vitamin D (CHOLECALCIFEROL) 25 MCG (1000 UT) TABS tablet Take 1 tablet by mouth daily      valACYclovir (VALTREX) 1 g tablet Take 1 tablet by mouth as needed      Multiple Vitamins-Minerals (THERAPEUTIC MULTIVITAMIN-MINERALS) tablet Take 1 tablet by mouth daily      nitrofurantoin, macrocrystal-monohydrate, (MACROBID) 100 MG capsule Take 100 mg by mouth as needed      aspirin EC 81 MG EC tablet Take 1 tablet by mouth 2 times daily for 21 days 42 tablet 0     No current facility-administered medications for this visit. Allergies   Allergen Reactions    Latex Rash     COUGHING  Other reaction(s): Cough; Itching, itchy eyes,coughing      Adhesive Tape Itching     BLISTER       Review of Systems:  A 14 point review of systems was completed by the patient and is available in the media section of the scanned medical record and was reviewed on 9/23/2021. The review is negative with the exception of those things mentioned in the HPI and Past Medical History     Vital Signs:   Ht 5' (1.524 m)   Wt 111 lb (50.3 kg)   BMI 21.68 kg/m²     General Exam:   Mental Status: The patient is oriented to time, place and person. The patient's mood and affect are appropriate. Neurological: The patient has good coordination. There is no weakness or sensory deficit. Knee Examination: Right    Skin:  There are no skin lesions, cellulitis, or extreme edema in the lower extremities. Sensation is grossly intact to light touch bilaterally lower extremity. The patient has warm and well-perfused Bilateral lower extremities with brisk capillary refill. Inspection:  The is no edema, no gross deformities, and no signs of infection. Surgical incision is healing well with no signs of infection. Palpation: There is patellofemoral crepitus, there is significant tenderness over the knee patella consistent with their post operative status. Range of Motion:  0 to 135  and grossly intact with pain and/or difficulty    Strength: Motor is grossly intact    Special Tests:  There is no ligament instability. Grinding/Crepitus (+)    Gait: non antalgic  without the use of assistive devices    Alignment: neutral    Radiology:     None      Office Procedures:  No orders of the defined types were placed in this encounter. Assessment: Gilford Picker is a 42 y.o. female who presents for follow up of right knee(s). 3 Months Post Op R Synovectomy/Manip/Mild Arthrofibrosis    Patient's workup and evaluation were reviewed with the patient in the office today. Imaging was also reviewed with the patient in the clinic today. Given the patient's history and physical exam the patient is healing appropriately on the post operative course. We do not think it would be good for her to return to 12 hour shifts with her knee recovering from major surgeries. We would recommend that her max would be 8 hour shifts. If she could do 4 hours of standing and then 4 hours of sitting jobs that would be best for her overall. We will also have see a rheumatologist closer to home as they can monitor her steriod/anti-inflammatory cycles. She does have some mild fibrosis returning but we think it's manageable. We will have her start another month of low dose prednisone now. We would also like to see her increase her overall strength. Treatment Plan:    1. Activity modification and rest  2. Ice 20 minutes every 1-2 hours PRN  3. NSAIDs PRN  4. Elevation at least 2 inches above the heart with pillows supporting the joint underneath for swelling  5. Home exercises to maintain strength and range of motion  6. Physical therapy including Strengthening  7. Follow up with Rheumatologist     Diagnoses and treatments were reviewed with the patient today. Patient education material was provided. Questions were entertained and answered to the patient's satisfaction. Follow up: PRN      I, Brandt Level, ATC am scribing for and in the presence of Dr. Petty Estrada.    The physical examination was performed by Dr. Pablo Herring Cora. All counseling during the appointment was performed between the patient and Dr. Elizabeth Hou. 09/23/21 10:11 AM   Lea Juan David Ortega MS, ATC    This patient exhibits moderate complexity for obtaining an independent history, reviewing past medical records, independent interpretation of diagnostic imaging, and further coordinating care. The patient exhibits moderate risk. Approximately 30 minutes were spent reviewing past medical records, imaging, educating the patient, and coordinating care. This dictation was performed with a verbal recognition program (DRAGON) and it was checked for errors. It is possible that there are still dictated errors within this office note. If so, please bring any errors to my attention for an addendum. All efforts were made to ensure that this office note is accurate. Supervising Physician Attestation:  I, Dr. Elizabeth Hou, personally performed the services described in this documentation as scribed above, and it is both accurate and complete and I agree with all pertinent clinical information. I personally interviewed the patient and performed a physical examination and medical decision making. I discussed the patient's condition and treatment options and have  reviewed and agree with the past medical, family and social history unless otherwise noted. All of the patient's questions were answered. Board Certified Orthopaedic Surgeon  [facility] and [address]  President and Medical Director  Eladia Duran  Professor of [facility] Cristine Humphrey                                                                                                                                                               This note was created using voice recognition software. It has been proofread, but occasionally errors remain.  Please disregard these errors. They will be corrected as they are noted.

## 2021-10-13 ENCOUNTER — TELEPHONE (OUTPATIENT)
Dept: ORTHOPEDIC SURGERY | Age: 46
End: 2021-10-13

## 2021-12-21 ENCOUNTER — TELEPHONE (OUTPATIENT)
Dept: ORTHOPEDIC SURGERY | Age: 46
End: 2021-12-21

## 2021-12-21 NOTE — TELEPHONE ENCOUNTER
Called to [facility] [phone] Prednisone 5mg 1 tab daily #30 with 1 refill RETURNED PT CALL FROM ANSWERING SERVICE AND SCHEDULE ANATOMY SCAN. PT IS AWARE TO ARRIVE 15 MINS EARLY AND TNC

## 2022-01-13 ENCOUNTER — OFFICE VISIT (OUTPATIENT)
Dept: ORTHOPEDIC SURGERY | Age: 47
End: 2022-01-13
Payer: COMMERCIAL

## 2022-01-13 VITALS — TEMPERATURE: 97.4 F | WEIGHT: 111 LBS | BODY MASS INDEX: 21.79 KG/M2 | HEIGHT: 60 IN

## 2022-01-13 DIAGNOSIS — M24.661 FIBROSIS OF RIGHT KNEE JOINT: Primary | ICD-10-CM

## 2022-01-13 PROCEDURE — 99213 OFFICE O/P EST LOW 20 MIN: CPT | Performed by: ORTHOPAEDIC SURGERY

## 2022-01-13 RX ORDER — MELOXICAM 15 MG/1
15 TABLET ORAL DAILY PRN
Qty: 90 TABLET | Refills: 1 | Status: SHIPPED | OUTPATIENT
Start: 2022-01-13

## 2022-01-13 NOTE — PROGRESS NOTES
Never Smoker    Smokeless tobacco: Never Used   Vaping Use    Vaping Use: Never used   Substance and Sexual Activity    Alcohol use: Yes     Comment: RARE    Drug use: Never    Sexual activity: None   Other Topics Concern    None   Social History Narrative    None     Social Determinants of Health     Financial Resource Strain:     Difficulty of Paying Living Expenses: Not on file   Food Insecurity:     Worried About Running Out of Food in the Last Year: Not on file    Sherrie of Food in the Last Year: Not on file   Transportation Needs:     Lack of Transportation (Medical): Not on file    Lack of Transportation (Non-Medical):  Not on file   Physical Activity:     Days of Exercise per Week: Not on file    Minutes of Exercise per Session: Not on file   Stress:     Feeling of Stress : Not on file   Social Connections:     Frequency of Communication with Friends and Family: Not on file    Frequency of Social Gatherings with Friends and Family: Not on file    Attends Yazidism Services: Not on file    Active Member of [address] [place] [address] or Organizations: Not on file    Attends Club or Organization Meetings: Not on file    Marital Status: Not on file   Intimate Partner Violence:     Fear of Current or Ex-Partner: Not on file    Emotionally Abused: Not on file    Physically Abused: Not on file    Sexually Abused: Not on file   Housing Stability:     Unable to Pay for Housing in the Last Year: Not on file    Number of [place] in the Last Year: Not on file    Unstable Housing in the Last Year: Not on file       Current Outpatient Medications   Medication Sig Dispense Refill    meloxicam (MOBIC) 15 MG tablet Take 1 tablet by mouth daily as needed for Pain 90 tablet 1    diclofenac (VOLTAREN) 75 MG EC tablet Take 1 tablet by mouth 2 times daily (with meals) 40 tablet 0    senna-docusate (PERICOLACE) 8.6-50 MG per tablet Take 2 tablets by mouth daily as needed for Constipation 30 tablet 0    aspirin EC 0 to 135  and grossly intact with pain and/or difficulty    Strength: Motor is grossly intact    Special Tests: There is no ligament instability. Grinding/Crepitus (-)    Gait: non antalgic  without the use of assistive devices    Alignment: neutral    Radiology:     None      Office Procedures:  No orders of the defined types were placed in this encounter. Assessment: Janet Dorman is a 42 y.o. female who presents for follow up of right knee(s). 7 Months Post Op R Total Synovectomy with Manipulation     Encounter Diagnosis   Name Primary?  Fibrosis of right knee joint Yes       Patient's workup and evaluation were reviewed with the patient in the office today. Imaging was also reviewed with the patient in the clinic today. Given the patient's history and physical exam we are pleased with her overall function. She has re-grown about 50% of her scar tissue but it hasn't impacted her that much yet. She has good patella mobility and good ROM when she works on her knee flexion. We'd like to see her use an anti-inflammatory intermittently when she does have pain and issues. We would still like to keep her on light duty at work for another 6 months to prevent any further scar tissue regrowth. We advised she work some pain (pain scale 3 to 5) but no more than that. She will also wean from formal physical therapy. She states understanding. Treatment Plan:    1. Activity modification and rest  2. Ice 20 minutes every 1-2 hours PRN  3. NSAIDs PRN  4. Elevation at least 2 inches above the heart with pillows supporting the joint underneath for swelling  5. Home exercises to maintain strength and range of motion      Diagnoses and treatments were reviewed with the patient today. Patient education material was provided. Questions were entertained and answered to the patient's satisfaction. Follow up: NICHELLE      I, Gala Judge ATC am scribing for and in the presence of Dr. Tavon Cuevas.    The physical examination was performed by Dr. Dimitrios Cornejo. All counseling during the appointment was performed between the patient and Dr. Dimitrios Cornejo. 01/13/22 1:21 PM   Lea Alfred MS, ATC    This patient exhibits moderate complexity for obtaining an independent history, reviewing past medical records, independent interpretation of diagnostic imaging, and further coordinating care. The patient exhibits moderate risk. Approximat. SIGNNOYES  y 25 minutes were spent reviewing past medical records, imaging, educating the patient, and coordinating care. This dictation was performed with a verbal recognition program (DRAGON) and it was checked for errors. It is possible that there are still dictated errors within this office note. If so, please bring any errors to my attention for an addendum. All efforts were made to ensure that this office note is accurate. Supervising Physician Attestation:  I, Dr. Dimitrios Cornejo, personally performed the services described in this documentation as scribed above, and it is both accurate and complete and I agree with all pertinent clinical information. I personally interviewed the patient and performed a physical examination and medical decision making. I discussed the patient's condition and treatment options and have  reviewed and agree with the past medical, family and social history unless otherwise noted. All of the patient's questions were answered.       Board Certified Orthopaedic Surgeon  [address] [facility] and [address] and [address] and [employer]  Professor of Cristine Salvador

## (undated) DEVICE — SUTURE VCRL SZ 0 L18IN ABSRB UD L36MM CT-1 1/2 CIR J840D

## (undated) DEVICE — GLOVE SURG SZ 8.5 L11.85IN FNGR THK9.8MIL STRW LTX POLYMER

## (undated) DEVICE — COVER,TABLE,HEAVY DUTY,77"X90",STRL: Brand: MEDLINE

## (undated) DEVICE — SUTURE VCRL SZ 2-0 L18IN ABSRB UD CT-1 L36MM 1/2 CIR J839D

## (undated) DEVICE — NEEDLE HYPO 22GA L1.5IN BLK POLYPR HUB S STL REG BVL STR

## (undated) DEVICE — PENCIL ES ULT VAC W TELSCP NOSE EZ CLN BLDE 10FT

## (undated) DEVICE — COVER,MAYO STAND,XL,STERILE: Brand: MEDLINE

## (undated) DEVICE — SUTURE MCRYL SZ 4-0 L27IN ABSRB UD L19MM PS-2 1/2 CIR PRIM Y426H

## (undated) DEVICE — PADDING UNDERCAST W4INXL4YD 100% COT CRIMPED FINISH WBRL II

## (undated) DEVICE — SOLUTION IV 1000ML 0.9% SOD CHL

## (undated) DEVICE — SURE SET-DOUBLE BASIN-LF: Brand: MEDLINE INDUSTRIES, INC.

## (undated) DEVICE — DRESSING FOAM SELF ADH 20X10 CM ABSORBENT MEPILEX BORDER

## (undated) DEVICE — PACK PROCEDURE SURG TOTAL KNEE

## (undated) DEVICE — GAUZE,PACKING STRIP,IODOFORM,1/2"X5YD,ST: Brand: CURAD

## (undated) DEVICE — PACK PROCEDURE SURG ARTHROSCOPY

## (undated) DEVICE — SYRINGE MED 30ML STD CLR PLAS LUERLOCK TIP N CTRL DISP

## (undated) DEVICE — UNDERGLOVE SURG SZ 9 FNGR THK0.21MIL GRN LTX BEAD CUF

## (undated) DEVICE — INTENDED FOR TISSUE SEPARATION, AND OTHER PROCEDURES THAT REQUIRE A SHARP SURGICAL BLADE TO PUNCTURE OR CUT.: Brand: BARD-PARKER ® CARBON RIB-BACK BLADES

## (undated) DEVICE — GOWN,SIRUS,POLYRNF,BRTHSLV,XL,30/CS: Brand: MEDLINE

## (undated) DEVICE — JEWISH HOSPITAL TURNOVER KIT: Brand: MEDLINE INDUSTRIES, INC.

## (undated) DEVICE — COVER LT HNDL BLU PLAS

## (undated) DEVICE — SUTURE ETHBND EXCEL SZ 0 L18IN NONABSORBABLE GRN L36MM CT-1 CX21D

## (undated) DEVICE — FLUID TRAP FOR MINIVAC ES EQUIP FLD TRAP

## (undated) DEVICE — GOWN,SIRUS,POLYRNF,BRTHSLV,XLN/XXL,18/CS: Brand: MEDLINE

## (undated) DEVICE — APPLICATOR MEDICATED 26 CC SOLUTION HI LT ORNG CHLORAPREP

## (undated) DEVICE — BLADE SHV L13CM DIA4MM EXCALIBUR AGG COOLCUT

## (undated) DEVICE — PLATE ES AD W 9FT CRD 2

## (undated) DEVICE — BLADE SHV L13CM DIA4MM CRV TAPR TIP LD TORPEDO COOLCUT

## (undated) DEVICE — MARKER,SKIN,WI/RULER AND LABELS: Brand: MEDLINE

## (undated) DEVICE — PROBE ABLAT XL 90DEG ASPIR BPLR RF 1 PC ELECTRD ERGO HNDL

## (undated) DEVICE — BLADE SHV L13CM DIA5MM EXCALIBUR AGG COOLCUT

## (undated) DEVICE — 450 ML BOTTLE OF 0.05% CHLORHEXIDINE GLUCONATE IN 99.95% STERILE WATER FOR IRRIGATION, USP AND APPLICATOR.: Brand: IRRISEPT ANTIMICROBIAL WOUND LAVAGE

## (undated) DEVICE — SPLINT KNEE UNIV FOR LESS THAN 36IN L20IN FOAM LAM E CNTCT